# Patient Record
Sex: FEMALE | Race: ASIAN | NOT HISPANIC OR LATINO
[De-identification: names, ages, dates, MRNs, and addresses within clinical notes are randomized per-mention and may not be internally consistent; named-entity substitution may affect disease eponyms.]

---

## 2017-07-31 ENCOUNTER — RX ONLY (OUTPATIENT)
Age: 77
Setting detail: RX ONLY
End: 2017-07-31

## 2020-12-24 ENCOUNTER — APPOINTMENT (OUTPATIENT)
Dept: RADIOLOGY | Facility: MEDICAL CENTER | Age: 80
End: 2020-12-24
Attending: EMERGENCY MEDICINE
Payer: COMMERCIAL

## 2020-12-24 ENCOUNTER — HOSPITAL ENCOUNTER (EMERGENCY)
Facility: MEDICAL CENTER | Age: 80
End: 2020-12-24
Attending: EMERGENCY MEDICINE
Payer: COMMERCIAL

## 2020-12-24 VITALS
HEIGHT: 60 IN | OXYGEN SATURATION: 96 % | SYSTOLIC BLOOD PRESSURE: 136 MMHG | DIASTOLIC BLOOD PRESSURE: 77 MMHG | BODY MASS INDEX: 21.6 KG/M2 | WEIGHT: 110 LBS | HEART RATE: 108 BPM | RESPIRATION RATE: 19 BRPM | TEMPERATURE: 96.7 F

## 2020-12-24 DIAGNOSIS — R92.1 BREAST CALCIFICATIONS: ICD-10-CM

## 2020-12-24 DIAGNOSIS — F10.920 ALCOHOLIC INTOXICATION WITHOUT COMPLICATION (HCC): ICD-10-CM
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
## 2020-12-24 PROBLEM — F10.921 ACUTE ALCOHOLIC INTOXICATION WITH DELIRIUM (HCC): Status: ACTIVE | Noted: 2020-12-24

## 2020-12-24 LAB
ABO GROUP BLD: NORMAL
ALBUMIN SERPL BCP-MCNC: 4.3 G/DL (ref 3.2–4.9)
ALBUMIN/GLOB SERPL: 1.5 G/DL
ALP SERPL-CCNC: 81 U/L (ref 30–99)
ALT SERPL-CCNC: 27 U/L (ref 2–50)
ANION GAP SERPL CALC-SCNC: 12 MMOL/L (ref 7–16)
APTT PPP: 30.5 SEC (ref 24.7–36)
AST SERPL-CCNC: 73 U/L (ref 12–45)
BILIRUB SERPL-MCNC: 0.3 MG/DL (ref 0.1–1.5)
BLD GP AB SCN SERPL QL: NORMAL
BUN SERPL-MCNC: 9 MG/DL (ref 8–22)
CALCIUM SERPL-MCNC: 9.1 MG/DL (ref 8.5–10.5)
CHLORIDE SERPL-SCNC: 91 MMOL/L (ref 96–112)
CO2 SERPL-SCNC: 26 MMOL/L (ref 20–33)
CREAT SERPL-MCNC: 0.42 MG/DL (ref 0.5–1.4)
ERYTHROCYTE [DISTWIDTH] IN BLOOD BY AUTOMATED COUNT: 41.8 FL (ref 35.9–50)
ETHANOL BLD-MCNC: 362.1 MG/DL (ref 0–10)
GLOBULIN SER CALC-MCNC: 2.8 G/DL (ref 1.9–3.5)
GLUCOSE SERPL-MCNC: 76 MG/DL (ref 65–99)
HCT VFR BLD AUTO: 39.4 % (ref 37–47)
HGB BLD-MCNC: 13.9 G/DL (ref 12–16)
INR PPP: 0.9 (ref 0.87–1.13)
MCH RBC QN AUTO: 34.8 PG (ref 27–33)
MCHC RBC AUTO-ENTMCNC: 35.3 G/DL (ref 33.6–35)
MCV RBC AUTO: 98.7 FL (ref 81.4–97.8)
PLATELET # BLD AUTO: 176 K/UL (ref 164–446)
PMV BLD AUTO: 8.8 FL (ref 9–12.9)
POTASSIUM SERPL-SCNC: 3.9 MMOL/L (ref 3.6–5.5)
PROT SERPL-MCNC: 7.1 G/DL (ref 6–8.2)
PROTHROMBIN TIME: 12.5 SEC (ref 12–14.6)
RBC # BLD AUTO: 3.99 M/UL (ref 4.2–5.4)
RH BLD: NORMAL
SODIUM SERPL-SCNC: 129 MMOL/L (ref 135–145)
WBC # BLD AUTO: 2.9 K/UL (ref 4.8–10.8)

## 2020-12-24 PROCEDURE — 71045 X-RAY EXAM CHEST 1 VIEW: CPT

## 2020-12-24 PROCEDURE — 80307 DRUG TEST PRSMV CHEM ANLYZR: CPT

## 2020-12-24 PROCEDURE — 72125 CT NECK SPINE W/O DYE: CPT

## 2020-12-24 PROCEDURE — 70450 CT HEAD/BRAIN W/O DYE: CPT

## 2020-12-24 PROCEDURE — 36415 COLL VENOUS BLD VENIPUNCTURE: CPT

## 2020-12-24 PROCEDURE — 85730 THROMBOPLASTIN TIME PARTIAL: CPT

## 2020-12-24 PROCEDURE — 72170 X-RAY EXAM OF PELVIS: CPT

## 2020-12-24 PROCEDURE — 71260 CT THORAX DX C+: CPT

## 2020-12-24 PROCEDURE — 80053 COMPREHEN METABOLIC PANEL: CPT

## 2020-12-24 PROCEDURE — 305949 HCHG RED TRAUMA ACT PRE-NOTIFY NO CC

## 2020-12-24 PROCEDURE — 85027 COMPLETE CBC AUTOMATED: CPT

## 2020-12-24 PROCEDURE — 86901 BLOOD TYPING SEROLOGIC RH(D): CPT

## 2020-12-24 PROCEDURE — 72131 CT LUMBAR SPINE W/O DYE: CPT

## 2020-12-24 PROCEDURE — 700105 HCHG RX REV CODE 258: Performed by: EMERGENCY MEDICINE

## 2020-12-24 PROCEDURE — 99285 EMERGENCY DEPT VISIT HI MDM: CPT

## 2020-12-24 PROCEDURE — 86900 BLOOD TYPING SEROLOGIC ABO: CPT

## 2020-12-24 PROCEDURE — 86850 RBC ANTIBODY SCREEN: CPT

## 2020-12-24 PROCEDURE — 85610 PROTHROMBIN TIME: CPT

## 2020-12-24 PROCEDURE — 72128 CT CHEST SPINE W/O DYE: CPT

## 2020-12-24 PROCEDURE — 700117 HCHG RX CONTRAST REV CODE 255: Performed by: EMERGENCY MEDICINE

## 2020-12-24 RX ORDER — SODIUM CHLORIDE, SODIUM LACTATE, POTASSIUM CHLORIDE, CALCIUM CHLORIDE 600; 310; 30; 20 MG/100ML; MG/100ML; MG/100ML; MG/100ML
1000 INJECTION, SOLUTION INTRAVENOUS ONCE
Status: COMPLETED | OUTPATIENT
Start: 2020-12-24 | End: 2020-12-24

## 2020-12-24 RX ADMIN — SODIUM CHLORIDE, POTASSIUM CHLORIDE, SODIUM LACTATE AND CALCIUM CHLORIDE 1000 ML: 600; 310; 30; 20 INJECTION, SOLUTION INTRAVENOUS at 03:12

## 2020-12-24 RX ADMIN — IOHEXOL 80 ML: 350 INJECTION, SOLUTION INTRAVENOUS at 02:10

## 2020-12-24 SDOH — HEALTH STABILITY: MENTAL HEALTH: HOW OFTEN DO YOU HAVE A DRINK CONTAINING ALCOHOL?: 4 OR MORE TIMES A WEEK

## 2020-12-24 ASSESSMENT — LIFESTYLE VARIABLES
DO YOU DRINK ALCOHOL: YES
REASON UNABLE TO ASSESS: DISORIENTED

## 2020-12-24 NOTE — ED NOTES
ERP at bedside walking patient. Patient okay to go. Called patient's caregiver Mark, he will be here soon.

## 2020-12-24 NOTE — ED NOTES
Patient assisted to the bathroom, patient continues to need assistance. Spoke to patient's caregiver, Mark, over the phone, plan of care updated, notified ERP. ERP stated patient needs to ambulate independently before leaving.      mark: [phone]

## 2020-12-24 NOTE — ED NOTES
Pt discharged home with SO Rabia. IV discontinued and gauze placed, pt in possession of belongings. Caregiver rabia refused discharge paperwork and upset. Yelling at RN about patient's close. Patient's close were soiled, and RN explained to patient's caregiver patient was a trauma Red and clothes had to be cut in order to assess the patient in the trauma bay. Caregiver continued to take photos of the room, and the patient's clothes. Charge RN at bedside to speak to patient's caregiver. RN assisted patient in brand new clothes and jacket, socks, and provided hospital socks to patient. ERP spoke to patient's SO/caregiver, however patient's caregiver only upset about clothes and how RN has to pay for them. Patient's caregiver refused a WC when offered to patient. Patient ambulatory holding caregiver's arm to the lobby.     /77   Pulse (!) 108   Temp 35.9 °C (96.7 °F) (Temporal)   Resp 19   Ht 1.524 m (5')   Wt 49.9 kg (110 lb)   SpO2 96%   BMI 21.48 kg/m²

## 2020-12-24 NOTE — ED NOTES
Patient C-spine cleared, discussed with GAURANG reyez, removed c-collar, walked patient to the bathroom, patient a SBA due to some unsteadiness. Patient voided and assisted back to room. Provided patient telephone to call friend.

## 2020-12-24 NOTE — ED NOTES
LR bolus administered per MAR. Patient tearful, RN at bedside providing therapeutic communication and active listening. VS taken.

## 2020-12-24 NOTE — H&P
DATE OF ADMISSION:  12/24/2020     REASON FOR EVALUATION: Trauma red activation.     HISTORY OF PRESENT ILLNESS:  This is an 80-year-old woman.  She apparently was   drinking in a bar all day and fell off her bar stool.  She had altered mental   status and was brought in as a trauma red.  The patient did have a bruise on   her back but no other stigmata of trauma.  Her vital signs were stable.  Her   GCS was 11 based primarily on incomprehensible speech and inconsistent   following of commands.  The patient did not have focal neurologic findings.    The patient was evaluated extensively radiologically essentially getting a pan   scan that did not show any acute injuries.     PAST MEDICAL HISTORY:  Significant for no known allergies.  She currently is   still somewhat incoherent and uncooperative and unable to provide any   meaningful past medical history.  The patient has no records here.     REVIEW OF SYSTEMS:  The patient's review of systems is currently unobtainable.     SOCIAL HISTORY:  She does apparently drink.     PHYSICAL EXAMINATION:    GENERAL:  Shows a slender woman of stated age who does not appear to be in   marked acute distress.  Intermittently, she is sitting up.  She is not   conversational.  She is no longer wearing a cervical collar.  She has been   hemodynamically stable.  HEAD:  She has no stigmata of head trauma.  SKIN:  She does have a bruise on her back.  NECK:  Atraumatic.  CHEST:  Clear.  HEART:  Normal.  ABDOMEN:  Soft and benign.  EXTREMITIES:  Free of deformity.  No joint effusion or edema.     LABORATORY DATA:  The patient's laboratories featured blood alcohol of 362.1.    Her sodium was 129 with a normal blood glucose of 76.  Her creatinine was   0.42, AST was 73.  PT was normal at 12.5 with a PTT of 30.5.  She has a white   count of 2.9 with hemoglobin of 13.9.  Her MCV is elevated at 99.  Platelet   count was 176.     The patient's radiologic evaluation included an AP pelvis, which  showed no   fracture.  Chest x-ray which was normal.  A CT scan of the head which shows no   evidence of acute infarct, hemorrhage or mass.  She does have significant   global panatrophy of the cerebrum.  The patient's chest, abdomen and pelvis   showed no acute injury with no distended bladder.  She had granulomas or   calcifications in both breasts and gallstones.  CT of her L-spine showed no   traumatic injury.  CT scan of the C-spine was negative for traumatic injury   and C-spine CT was negative for traumatic injury.     The patient's clinical course has been one of progressive improvement.  ER   physician plans continued observation in the emergency room to the point where   the patient could be eligible for reevaluation, tertiary survey and likely   discharge.     IMPRESSION:  Clinical impression at this time is intoxication with delirium,   which is improving.     PLAN:  The current plan is acceptable to the trauma service.     Time of evaluation, clinical care setting 60 minutes of critical care time on   12/24/2020.        ______________________________  MD TORREY GIRALDO/HERVE    DD:  12/24/2020 03:52  DT:  12/24/2020 04:57    Job#:  [id number]

## 2020-12-24 NOTE — ED NOTES
Patient keeps removing leads a pulse ox, despite education. Patient moving all four extremities, trying to swing at staff, RN re-oriented patient.

## 2020-12-24 NOTE — ED TRIAGE NOTES
Bibi N[place]  80 y.o. female  Chief Complaint   Patient presents with   • Trauma Red     Fell out of chair, hematoma to back of head, GCS 11. Unknown blood thinners     Pt BIB EMS as a Trauma Red for above CC.    Report given to Akila PHOENIX.     Blood Pressure : (!) 178/100, Pulse: 81, Respiration: 16, Temperature: 36.5 °C (97.7 °F), Height: 152.4 cm (5'), Weight: 49.9 kg (110 lb), BMI (Calculated): 21.48, BSA (Calculated): 1.5, Pulse Oximetry: 97 %, O2 (LPM): 0

## 2020-12-24 NOTE — ED PROVIDER NOTES
ED Provider Note    Patient's care transferred from [facility].  Patient came in as a trauma red.  She fell off a stool and was altered this is thought to be secondary to alcohol intoxication.  Her alcohol level was quite high.  She was pan scanned, CTs were unrevealing.  Anticipate discharge to home once the patient can ambulate and has returned to baseline.    4:45 AM patient seen at the bedside.  She is awake and alert.  She ambulated with me in the hallways.  I feel she can safely be discharged home.  Her roommate/caregiver/boyfriend, is on his way.  Will discuss ongoing care with him.    5:04 AM patient's family member is at the bedside.  He is appropriate.  He assures me, he stays with her at all times and will ensure that patient does not fall.  In his care, I feel the patient can safely be discharged home to continue to sober up.  She is awake and alert.  She is able to ambulate.  Patient be discharged to home.  She is in stable condition.

## 2020-12-24 NOTE — ED NOTES
"Patient sleeping on gurney, report given by Cj PHOENIX, assumed care of patient. Patient placed in gown, and on montior. Patient is disoriented and keeps asking about \"Mark,\" pt unable to remember a phone number. Patient is tearful.   "

## 2020-12-24 NOTE — ED PROVIDER NOTES
ED Provider Note    CHIEF COMPLAINT  Chief Complaint   Patient presents with   • Trauma Red     Fell out of chair, hematoma to back of head, GCS 11. Unknown blood thinners       HPI  Bibi Maher is a 80 y.o. female who presents after a fall from a seated position while in a bar.  She had been there for several hours.  She struck the back of her head on the floor.  She has very slurred speech and delayed responses.  Unable to provide much history due to intoxicated state.  Oriented to self only.    REVIEW OF SYSTEMS  See HPI for further details.  Limited secondary to the patient's intoxicated state    PAST MEDICAL HISTORY       SOCIAL HISTORY  Social History     Tobacco Use   • Smoking status: Never Smoker   • Smokeless tobacco: Never Used   • Tobacco comment: daily   Substance and Sexual Activity   • Alcohol use: Yes     Frequency: 4 or more times a week   • Drug use: Never   • Sexual activity: Not on file       SURGICAL HISTORY  patient denies any surgical history    CURRENT MEDICATIONS  Home Medications     Reviewed by Cj Estes R.N. (Registered Nurse) on 12/24/20 at 0224  Med List Status: Partial   Medication Last Dose Status        Patient Madi Taking any Medications                       ALLERGIES  Not on File    PHYSICAL EXAM  VITAL SIGNS: BP (!) 178/100   Pulse 81   Temp 36.5 °C (97.7 °F) (Temporal)   Resp 16   Ht 1.524 m (5')   Wt 49.9 kg (110 lb)   SpO2 97%   BMI 21.48 kg/m²   Pulse ox interpretation: I interpret this pulse ox as normal.  Constitutional: Somnolent in no apparent distress.  HENT: No signs of trauma, Bilateral external ears normal, Nose normal.   Eyes: Pupils are equal and reactive, Conjunctiva normal, Non-icteric.   Neck: Normal range of motion, No tenderness, Supple, No stridor.   Cardiovascular: Regular rate and rhythm.   Thorax & Lungs: Normal breath sounds, No respiratory distress, No wheezing, No chest tenderness.   Abdomen: Bowel sounds normal, Soft, No  tenderness, No masses, No pulsatile masses. No peritoneal signs.  Skin: Warm, Dry, No erythema, No rash.  Left elbow abrasion and small 2 cm abrasion to the mid back along the midline  Back: No bony tenderness or obvious step-off, No CVA tenderness.   Extremities: Intact distal pulses, No edema, No tenderness, No cyanosis  Musculoskeletal: Good range of motion in all major joints. No tenderness to palpation or major deformities noted.   Neurologic: Somnolent, slurred speech, is a very strong odor of alcohol, Normal motor function, Normal sensory function, No focal deficits noted.       DIAGNOSTIC STUDIES / PROCEDURES    EKG - Physician interpretation  No results found for this or any previous visit.       LABS  Labs Reviewed   DIAGNOSTIC ALCOHOL - Abnormal; Notable for the following components:       Result Value    Diagnostic Alcohol 362.1 (*)     All other components within normal limits   CBC WITHOUT DIFFERENTIAL - Abnormal; Notable for the following components:    WBC 2.9 (*)     RBC 3.99 (*)     MCV 98.7 (*)     MCH 34.8 (*)     MCHC 35.3 (*)     MPV 8.8 (*)     All other components within normal limits   COMP METABOLIC PANEL - Abnormal; Notable for the following components:    Sodium 129 (*)     Chloride 91 (*)     Creatinine 0.42 (*)     AST(SGOT) 73 (*)     All other components within normal limits   PROTHROMBIN TIME   APTT   COD (ADULT)   COMPONENT CELLULAR   ESTIMATED GFR   ABO RH CONFIRM         RADIOLOGY  CT-CSPINE WITHOUT PLUS RECONS   Final Result      No acute fracture or traumatic listhesis in the cervical spine.      CT-HEAD W/O   Final Result      1. No CT evidence of acute infarct, hemorrhage or mass.   2. Moderate global parenchymal atrophy. Chronic small vessel ischemic changes.      DX-PELVIS-1 OR 2 VIEWS   Final Result      No acute osseous abnormality.      DX-CHEST-LIMITED (1 VIEW)   Final Result      No acute cardiopulmonary abnormality.      CT-CHEST,ABDOMEN,PELVIS WITH   Final Result       1.  No acute traumatic injury in the chest, abdomen or pelvis.   2.  Overdistended bladder. Recommend catheterization.   3.  Extensive granulomas/calcifications in both breasts.   4.  Cholelithiasis.      CT-TSPINE W/O PLUS RECONS   Final Result      No acute fracture or listhesis in the thoracic spine.      CT-LSPINE W/O PLUS RECONS   Final Result      No acute fracture or traumatic listhesis in the lumbar spine.      US-ABORTED US PROCEDURE    (Results Pending)         COURSE & MEDICAL DECISION MAKING    Medications   iohexol (OMNIPAQUE) 350 mg/mL (80 mL Intravenous Given 12/24/20 0210)   lactated ringers infusion (BOLUS) (0 mL Intravenous Stopped 12/24/20 0973)       Pertinent Labs & Imaging studies reviewed. (See chart for details)  80 y.o. female presenting with altered mental status in the setting of a fall out of a chair while at a bar.  She had been drinking alcohol heavily throughout the day.  No obvious signs of trauma to the head though the patient has significantly slurred speech and is very somnolent upon arrival.  Has an abrasion to the mid back and left elbow.  Normal range of motion to her extremities.  Due to her heavily intoxicated state, broad CT imaging was performed to ensure there is no significant internal trauma or bony injury.  CT of the head, neck, chest, abdomen, pelvis, T-spine, L-spine were all unremarkable for acute injury.  Patient will be monitored here for improvement in mental status as her alcohol level was significantly elevated at 362.  This is likely the root cause of the patient's altered mental status rather than trauma.  Due to the patient's very depressed mental status upon arrival in the setting of trauma however, the patient was activated as a trauma red due to concern for possible traumatic cause of the patient's altered mental status.  Fortunately, there does not appear to be any need for surgical intervention at this time.    The patient was signed out to the [facility]  physician.  To monitor the patient's mental status and metabolism of alcohol.  Will likely discharge home once the patient is sober or once the patient is more alert and awake and can be safely discharged in the care of a family member.    CT did show incidental breast calcifications for which the patient was recommended to follow-up with a primary care physician.    The patient was instructed to follow-up with primary care physician for further management.  To return immediately for any worsening symptoms or development of any other concerning signs or symptoms. The patient verbalizes understanding in their own words.    /64   Pulse 90   Temp 36.5 °C (97.7 °F) (Temporal)   Resp 18   Ht 1.524 m (5')   Wt 49.9 kg (110 lb)   SpO2 99%   BMI 21.48 kg/m²     The patient was referred to primary care where they will receive further BP management.      No follow-up provider specified.    FINAL IMPRESSION  1. Alcoholic intoxication without complication (HCC)    2. Fall, initial encounter    3. Breast calcifications            Electronically signed by: Harry Hassan M.D., 12/24/2020 2:12 AM

## 2021-01-14 DIAGNOSIS — Z23 NEED FOR VACCINATION: ICD-10-CM

## 2023-04-26 ENCOUNTER — HOSPITAL ENCOUNTER (OUTPATIENT)
Dept: RADIOLOGY | Facility: MEDICAL CENTER | Age: 83
End: 2023-04-26
Payer: COMMERCIAL

## 2023-04-26 ENCOUNTER — HOSPITAL ENCOUNTER (INPATIENT)
Facility: MEDICAL CENTER | Age: 83
LOS: 7 days | DRG: 082 | End: 2023-05-03
Attending: EMERGENCY MEDICINE | Admitting: INTERNAL MEDICINE
Payer: COMMERCIAL

## 2023-04-26 DIAGNOSIS — S70.01XA CONTUSION OF RIGHT HIP, INITIAL ENCOUNTER: ICD-10-CM

## 2023-04-26 DIAGNOSIS — S06.5XAA SUBDURAL HEMATOMA (HCC): ICD-10-CM

## 2023-04-26 DIAGNOSIS — S06.5XAA SDH (SUBDURAL HEMATOMA) (HCC): ICD-10-CM

## 2023-04-26 DIAGNOSIS — I62.00 SUBDURAL HEMORRHAGE (HCC): ICD-10-CM

## 2023-04-26 PROBLEM — F10.10 ALCOHOL ABUSE: Status: ACTIVE | Noted: 2023-04-26

## 2023-04-26 LAB
ABO GROUP BLD: NORMAL
ALBUMIN SERPL BCP-MCNC: 2.8 G/DL (ref 3.2–4.9)
ALBUMIN/GLOB SERPL: 1.2 G/DL
ALP SERPL-CCNC: 60 U/L (ref 30–99)
ALT SERPL-CCNC: 12 U/L (ref 2–50)
ANION GAP SERPL CALC-SCNC: 12 MMOL/L (ref 7–16)
APTT PPP: 29.3 SEC (ref 24.7–36)
AST SERPL-CCNC: 31 U/L (ref 12–45)
BASOPHILS # BLD AUTO: 0.2 % (ref 0–1.8)
BASOPHILS # BLD: 0.01 K/UL (ref 0–0.12)
BILIRUB SERPL-MCNC: 0.9 MG/DL (ref 0.1–1.5)
BLD GP AB SCN SERPL QL: NORMAL
BUN SERPL-MCNC: 11 MG/DL (ref 8–22)
CALCIUM ALBUM COR SERPL-MCNC: 8 MG/DL (ref 8.5–10.5)
CALCIUM SERPL-MCNC: 7 MG/DL (ref 8.5–10.5)
CFT BLD TEG: 5.1 MIN (ref 4.6–9.1)
CFT P HPASE BLD TEG: 5.3 MIN (ref 4.3–8.3)
CHLORIDE SERPL-SCNC: 104 MMOL/L (ref 96–112)
CHOLEST SERPL-MCNC: 119 MG/DL (ref 100–199)
CLOT ANGLE BLD TEG: 68.8 DEGREES (ref 63–78)
CO2 SERPL-SCNC: 23 MMOL/L (ref 20–33)
CREAT SERPL-MCNC: 0.48 MG/DL (ref 0.5–1.4)
CT.EXTRINSIC BLD ROTEM: 1.9 MIN (ref 0.8–2.1)
EOSINOPHIL # BLD AUTO: 0 K/UL (ref 0–0.51)
EOSINOPHIL NFR BLD: 0 % (ref 0–6.9)
ERYTHROCYTE [DISTWIDTH] IN BLOOD BY AUTOMATED COUNT: 45.2 FL (ref 35.9–50)
ETHANOL BLD-MCNC: <10.1 MG/DL
GFR SERPLBLD CREATININE-BSD FMLA CKD-EPI: 93 ML/MIN/1.73 M 2
GLOBULIN SER CALC-MCNC: 2.3 G/DL (ref 1.9–3.5)
GLUCOSE SERPL-MCNC: 85 MG/DL (ref 65–99)
HCT VFR BLD AUTO: 32 % (ref 37–47)
HDLC SERPL-MCNC: 66 MG/DL
HGB BLD-MCNC: 10.7 G/DL (ref 12–16)
IMM GRANULOCYTES # BLD AUTO: 0.02 K/UL (ref 0–0.11)
IMM GRANULOCYTES NFR BLD AUTO: 0.5 % (ref 0–0.9)
INR PPP: 1.05 (ref 0.87–1.13)
LDLC SERPL CALC-MCNC: 44 MG/DL
LYMPHOCYTES # BLD AUTO: 0.53 K/UL (ref 1–4.8)
LYMPHOCYTES NFR BLD: 12.5 % (ref 22–41)
MCF BLD TEG: 49.4 MM (ref 52–69)
MCF.PLATELET INHIB BLD ROTEM: 14.7 MM (ref 15–32)
MCH RBC QN AUTO: 33.5 PG (ref 27–33)
MCHC RBC AUTO-ENTMCNC: 33.4 G/DL (ref 33.6–35)
MCV RBC AUTO: 100.3 FL (ref 81.4–97.8)
MONOCYTES # BLD AUTO: 0.38 K/UL (ref 0–0.85)
MONOCYTES NFR BLD AUTO: 8.9 % (ref 0–13.4)
NEUTROPHILS # BLD AUTO: 3.31 K/UL (ref 2–7.15)
NEUTROPHILS NFR BLD: 77.9 % (ref 44–72)
NRBC # BLD AUTO: 0 K/UL
NRBC BLD-RTO: 0 /100 WBC
PA AA BLD-ACNC: 45.8 % (ref 0–11)
PA ADP BLD-ACNC: 55.6 % (ref 0–17)
PLATELET # BLD AUTO: 108 K/UL (ref 164–446)
PMV BLD AUTO: 9.3 FL (ref 9–12.9)
POTASSIUM SERPL-SCNC: 3.3 MMOL/L (ref 3.6–5.5)
PROT SERPL-MCNC: 5.1 G/DL (ref 6–8.2)
PROTHROMBIN TIME: 13.6 SEC (ref 12–14.6)
RBC # BLD AUTO: 3.19 M/UL (ref 4.2–5.4)
RH BLD: NORMAL
SODIUM SERPL-SCNC: 139 MMOL/L (ref 135–145)
TEG ALGORITHM TGALG: ABNORMAL
TRIGL SERPL-MCNC: 46 MG/DL (ref 0–149)
WBC # BLD AUTO: 4.3 K/UL (ref 4.8–10.8)

## 2023-04-26 PROCEDURE — 80053 COMPREHEN METABOLIC PANEL: CPT

## 2023-04-26 PROCEDURE — 80061 LIPID PANEL: CPT

## 2023-04-26 PROCEDURE — 85384 FIBRINOGEN ACTIVITY: CPT

## 2023-04-26 PROCEDURE — 86850 RBC ANTIBODY SCREEN: CPT

## 2023-04-26 PROCEDURE — 85610 PROTHROMBIN TIME: CPT

## 2023-04-26 PROCEDURE — 82077 ASSAY SPEC XCP UR&BREATH IA: CPT

## 2023-04-26 PROCEDURE — 36415 COLL VENOUS BLD VENIPUNCTURE: CPT

## 2023-04-26 PROCEDURE — 85730 THROMBOPLASTIN TIME PARTIAL: CPT

## 2023-04-26 PROCEDURE — 99285 EMERGENCY DEPT VISIT HI MDM: CPT

## 2023-04-26 PROCEDURE — 85347 COAGULATION TIME ACTIVATED: CPT

## 2023-04-26 PROCEDURE — 85576 BLOOD PLATELET AGGREGATION: CPT

## 2023-04-26 PROCEDURE — 86901 BLOOD TYPING SEROLOGIC RH(D): CPT

## 2023-04-26 PROCEDURE — 305948 HCHG GREEN TRAUMA ACT PRE-NOTIFY NO CC

## 2023-04-26 PROCEDURE — 700105 HCHG RX REV CODE 258: Performed by: INTERNAL MEDICINE

## 2023-04-26 PROCEDURE — 86900 BLOOD TYPING SEROLOGIC ABO: CPT

## 2023-04-26 PROCEDURE — 99223 1ST HOSP IP/OBS HIGH 75: CPT | Mod: AI | Performed by: INTERNAL MEDICINE

## 2023-04-26 PROCEDURE — HZ2ZZZZ DETOXIFICATION SERVICES FOR SUBSTANCE ABUSE TREATMENT: ICD-10-PCS | Performed by: INTERNAL MEDICINE

## 2023-04-26 PROCEDURE — 770020 HCHG ROOM/CARE - TELE (206)

## 2023-04-26 PROCEDURE — 85025 COMPLETE CBC W/AUTO DIFF WBC: CPT

## 2023-04-26 PROCEDURE — 99222 1ST HOSP IP/OBS MODERATE 55: CPT | Performed by: SURGERY

## 2023-04-26 RX ORDER — BRIMONIDINE TARTRATE 2 MG/ML
1 SOLUTION/ DROPS OPHTHALMIC DAILY
Status: DISCONTINUED | OUTPATIENT
Start: 2023-04-27 | End: 2023-05-03 | Stop reason: HOSPADM

## 2023-04-26 RX ORDER — LABETALOL 200 MG/1
200 TABLET, FILM COATED ORAL EVERY 6 HOURS PRN
Status: DISCONTINUED | OUTPATIENT
Start: 2023-04-26 | End: 2023-05-03 | Stop reason: HOSPADM

## 2023-04-26 RX ORDER — OXYCODONE HYDROCHLORIDE 10 MG/1
10 TABLET ORAL
Status: DISCONTINUED | OUTPATIENT
Start: 2023-04-26 | End: 2023-05-03 | Stop reason: HOSPADM

## 2023-04-26 RX ORDER — LORAZEPAM 2 MG/ML
1 INJECTION INTRAMUSCULAR
Status: DISCONTINUED | OUTPATIENT
Start: 2023-04-26 | End: 2023-04-29

## 2023-04-26 RX ORDER — ONDANSETRON 2 MG/ML
4 INJECTION INTRAMUSCULAR; INTRAVENOUS EVERY 4 HOURS PRN
Status: DISCONTINUED | OUTPATIENT
Start: 2023-04-26 | End: 2023-04-26

## 2023-04-26 RX ORDER — LORAZEPAM 0.5 MG/1
0.5 TABLET ORAL EVERY 4 HOURS PRN
Status: DISCONTINUED | OUTPATIENT
Start: 2023-04-26 | End: 2023-04-29

## 2023-04-26 RX ORDER — LORAZEPAM 2 MG/ML
0.5 INJECTION INTRAMUSCULAR EVERY 4 HOURS PRN
Status: DISCONTINUED | OUTPATIENT
Start: 2023-04-26 | End: 2023-04-29

## 2023-04-26 RX ORDER — ONDANSETRON 4 MG/1
4 TABLET, ORALLY DISINTEGRATING ORAL EVERY 4 HOURS PRN
Status: DISCONTINUED | OUTPATIENT
Start: 2023-04-26 | End: 2023-05-03 | Stop reason: HOSPADM

## 2023-04-26 RX ORDER — ACETAMINOPHEN 325 MG/1
650 TABLET ORAL EVERY 6 HOURS PRN
Status: DISCONTINUED | OUTPATIENT
Start: 2023-04-26 | End: 2023-05-03 | Stop reason: HOSPADM

## 2023-04-26 RX ORDER — LORAZEPAM 2 MG/ML
2 INJECTION INTRAMUSCULAR
Status: DISCONTINUED | OUTPATIENT
Start: 2023-04-26 | End: 2023-04-29

## 2023-04-26 RX ORDER — BISACODYL 10 MG
10 SUPPOSITORY, RECTAL RECTAL
Status: DISCONTINUED | OUTPATIENT
Start: 2023-04-26 | End: 2023-05-03 | Stop reason: HOSPADM

## 2023-04-26 RX ORDER — ONDANSETRON 2 MG/ML
4 INJECTION INTRAMUSCULAR; INTRAVENOUS EVERY 4 HOURS PRN
Status: DISCONTINUED | OUTPATIENT
Start: 2023-04-26 | End: 2023-05-03 | Stop reason: HOSPADM

## 2023-04-26 RX ORDER — ONDANSETRON 4 MG/1
4 TABLET, ORALLY DISINTEGRATING ORAL EVERY 4 HOURS PRN
Status: DISCONTINUED | OUTPATIENT
Start: 2023-04-26 | End: 2023-04-26

## 2023-04-26 RX ORDER — GAUZE BANDAGE 2" X 2"
100 BANDAGE TOPICAL DAILY
Status: COMPLETED | OUTPATIENT
Start: 2023-04-27 | End: 2023-04-30

## 2023-04-26 RX ORDER — FOLIC ACID 1 MG/1
1 TABLET ORAL DAILY
Status: COMPLETED | OUTPATIENT
Start: 2023-04-27 | End: 2023-04-30

## 2023-04-26 RX ORDER — LABETALOL HYDROCHLORIDE 5 MG/ML
10 INJECTION, SOLUTION INTRAVENOUS
Status: DISCONTINUED | OUTPATIENT
Start: 2023-04-26 | End: 2023-05-03 | Stop reason: HOSPADM

## 2023-04-26 RX ORDER — OXYCODONE HYDROCHLORIDE 5 MG/1
5 TABLET ORAL
Status: DISCONTINUED | OUTPATIENT
Start: 2023-04-26 | End: 2023-05-03 | Stop reason: HOSPADM

## 2023-04-26 RX ORDER — LORAZEPAM 2 MG/1
2 TABLET ORAL
Status: DISCONTINUED | OUTPATIENT
Start: 2023-04-26 | End: 2023-04-29

## 2023-04-26 RX ORDER — AMOXICILLIN 250 MG
2 CAPSULE ORAL 2 TIMES DAILY
Status: DISCONTINUED | OUTPATIENT
Start: 2023-04-26 | End: 2023-05-03 | Stop reason: HOSPADM

## 2023-04-26 RX ORDER — LORAZEPAM 2 MG/1
4 TABLET ORAL
Status: DISCONTINUED | OUTPATIENT
Start: 2023-04-26 | End: 2023-04-29

## 2023-04-26 RX ORDER — ACETAMINOPHEN 650 MG/1
650 SUPPOSITORY RECTAL EVERY 4 HOURS PRN
Status: DISCONTINUED | OUTPATIENT
Start: 2023-04-26 | End: 2023-05-03 | Stop reason: HOSPADM

## 2023-04-26 RX ORDER — LORAZEPAM 2 MG/ML
1.5 INJECTION INTRAMUSCULAR
Status: DISCONTINUED | OUTPATIENT
Start: 2023-04-26 | End: 2023-04-29

## 2023-04-26 RX ORDER — SODIUM CHLORIDE 9 MG/ML
INJECTION, SOLUTION INTRAVENOUS CONTINUOUS
Status: DISCONTINUED | OUTPATIENT
Start: 2023-04-26 | End: 2023-04-27

## 2023-04-26 RX ORDER — ACETAMINOPHEN 325 MG/1
650 TABLET ORAL EVERY 4 HOURS PRN
Status: DISCONTINUED | OUTPATIENT
Start: 2023-04-26 | End: 2023-05-03 | Stop reason: HOSPADM

## 2023-04-26 RX ORDER — LORAZEPAM 1 MG/1
1 TABLET ORAL EVERY 4 HOURS PRN
Status: DISCONTINUED | OUTPATIENT
Start: 2023-04-26 | End: 2023-04-29

## 2023-04-26 RX ORDER — POLYETHYLENE GLYCOL 3350 17 G/17G
1 POWDER, FOR SOLUTION ORAL
Status: DISCONTINUED | OUTPATIENT
Start: 2023-04-26 | End: 2023-05-03 | Stop reason: HOSPADM

## 2023-04-26 RX ORDER — BRIMONIDINE TARTRATE 1.5 MG/ML
1 SOLUTION/ DROPS OPHTHALMIC DAILY
COMMUNITY
Start: 2023-02-01

## 2023-04-26 RX ORDER — MORPHINE SULFATE 4 MG/ML
4 INJECTION INTRAVENOUS
Status: DISCONTINUED | OUTPATIENT
Start: 2023-04-26 | End: 2023-05-03 | Stop reason: HOSPADM

## 2023-04-26 RX ADMIN — SODIUM CHLORIDE: 9 INJECTION, SOLUTION INTRAVENOUS at 23:26

## 2023-04-26 ASSESSMENT — LIFESTYLE VARIABLES
HOW MANY TIMES IN THE PAST YEAR HAVE YOU HAD 5 OR MORE DRINKS IN A DAY: 0
TOTAL SCORE: 2
TREMOR: NO TREMOR
HEADACHE, FULLNESS IN HEAD: NOT PRESENT
PAROXYSMAL SWEATS: NO SWEAT VISIBLE
AUDITORY DISTURBANCES: NOT PRESENT
AVERAGE NUMBER OF DAYS PER WEEK YOU HAVE A DRINK CONTAINING ALCOHOL: 21
EVER FELT BAD OR GUILTY ABOUT YOUR DRINKING: NO
TOTAL SCORE: 2
NAUSEA AND VOMITING: NO NAUSEA AND NO VOMITING
TREMOR: NO TREMOR
CONSUMPTION TOTAL: POSITIVE
ANXIETY: NO ANXIETY (AT EASE)
TOTAL SCORE: 0
DOES PATIENT WANT TO STOP DRINKING: NO
VISUAL DISTURBANCES: NOT PRESENT
HEADACHE, FULLNESS IN HEAD: NOT PRESENT
HAVE YOU EVER FELT YOU SHOULD CUT DOWN ON YOUR DRINKING: YES
PAROXYSMAL SWEATS: NO SWEAT VISIBLE
ANXIETY: NO ANXIETY (AT EASE)
ALCOHOL_USE: YES
AUDITORY DISTURBANCES: NOT PRESENT
TOTAL SCORE: 0
DO YOU DRINK ALCOHOL: YES
VISUAL DISTURBANCES: NOT PRESENT
AGITATION: NORMAL ACTIVITY
DOES PATIENT WANT TO STOP DRINKING: NO
HAVE PEOPLE ANNOYED YOU BY CRITICIZING YOUR DRINKING: YES
EVER HAD A DRINK FIRST THING IN THE MORNING TO STEADY YOUR NERVES TO GET RID OF A HANGOVER: NO
NAUSEA AND VOMITING: NO NAUSEA AND NO VOMITING
AGITATION: NORMAL ACTIVITY
TOTAL SCORE: 2
ORIENTATION AND CLOUDING OF SENSORIUM: ORIENTED AND CAN DO SERIAL ADDITIONS
ORIENTATION AND CLOUDING OF SENSORIUM: ORIENTED AND CAN DO SERIAL ADDITIONS
ON A TYPICAL DAY WHEN YOU DRINK ALCOHOL HOW MANY DRINKS DO YOU HAVE: 3

## 2023-04-26 ASSESSMENT — COPD QUESTIONNAIRES
DURING THE PAST 4 WEEKS HOW MUCH DID YOU FEEL SHORT OF BREATH: NONE/LITTLE OF THE TIME
COPD SCREENING SCORE: 5
DO YOU EVER COUGH UP ANY MUCUS OR PHLEGM?: YES, A FEW DAYS A WEEK OR MONTH
HAVE YOU SMOKED AT LEAST 100 CIGARETTES IN YOUR ENTIRE LIFE: YES

## 2023-04-26 ASSESSMENT — COGNITIVE AND FUNCTIONAL STATUS - GENERAL
DRESSING REGULAR LOWER BODY CLOTHING: A LOT
CLIMB 3 TO 5 STEPS WITH RAILING: A LOT
TURNING FROM BACK TO SIDE WHILE IN FLAT BAD: A LITTLE
DRESSING REGULAR UPPER BODY CLOTHING: A LITTLE
HELP NEEDED FOR BATHING: A LITTLE
TOILETING: A LOT
STANDING UP FROM CHAIR USING ARMS: A LITTLE
SUGGESTED CMS G CODE MODIFIER MOBILITY: CK
DAILY ACTIVITIY SCORE: 18
MOBILITY SCORE: 13
SUGGESTED CMS G CODE MODIFIER DAILY ACTIVITY: CK
STANDING UP FROM CHAIR USING ARMS: A LOT
CLIMB 3 TO 5 STEPS WITH RAILING: A LOT
HELP NEEDED FOR BATHING: A LITTLE
DRESSING REGULAR UPPER BODY CLOTHING: A LITTLE
SUGGESTED CMS G CODE MODIFIER MOBILITY: CL
MOVING TO AND FROM BED TO CHAIR: A LITTLE
DAILY ACTIVITIY SCORE: 19
MOVING TO AND FROM BED TO CHAIR: A LOT
TURNING FROM BACK TO SIDE WHILE IN FLAT BAD: A LITTLE
WALKING IN HOSPITAL ROOM: A LOT
SUGGESTED CMS G CODE MODIFIER DAILY ACTIVITY: CK
MOBILITY SCORE: 15
WALKING IN HOSPITAL ROOM: A LOT
DRESSING REGULAR LOWER BODY CLOTHING: A LITTLE
MOVING FROM LYING ON BACK TO SITTING ON SIDE OF FLAT BED: A LOT
MOVING FROM LYING ON BACK TO SITTING ON SIDE OF FLAT BED: A LOT
TOILETING: A LOT

## 2023-04-26 ASSESSMENT — FIBROSIS 4 INDEX
FIB4 SCORE: 6.96
FIB4 SCORE: 6.96

## 2023-04-26 ASSESSMENT — PAIN DESCRIPTION - PAIN TYPE: TYPE: ACUTE PAIN

## 2023-04-26 ASSESSMENT — PATIENT HEALTH QUESTIONNAIRE - PHQ9
1. LITTLE INTEREST OR PLEASURE IN DOING THINGS: NOT AT ALL
SUM OF ALL RESPONSES TO PHQ9 QUESTIONS 1 AND 2: 0
2. FEELING DOWN, DEPRESSED, IRRITABLE, OR HOPELESS: NOT AT ALL

## 2023-04-27 ENCOUNTER — APPOINTMENT (OUTPATIENT)
Dept: RADIOLOGY | Facility: MEDICAL CENTER | Age: 83
DRG: 082 | End: 2023-04-27
Attending: STUDENT IN AN ORGANIZED HEALTH CARE EDUCATION/TRAINING PROGRAM
Payer: COMMERCIAL

## 2023-04-27 PROBLEM — Z71.89 ADVANCE CARE PLANNING: Status: ACTIVE | Noted: 2023-04-27

## 2023-04-27 PROBLEM — E87.6 HYPOKALEMIA: Status: ACTIVE | Noted: 2023-04-27

## 2023-04-27 PROBLEM — D69.6 THROMBOCYTOPENIA (HCC): Status: ACTIVE | Noted: 2023-04-27

## 2023-04-27 PROBLEM — M25.551 PAIN OF RIGHT HIP: Status: ACTIVE | Noted: 2023-04-27

## 2023-04-27 LAB
APPEARANCE UR: CLEAR
BACTERIA #/AREA URNS HPF: ABNORMAL /HPF
BASOPHILS # BLD AUTO: 0.3 % (ref 0–1.8)
BASOPHILS # BLD: 0.01 K/UL (ref 0–0.12)
BILIRUB UR QL STRIP.AUTO: NEGATIVE
COLOR UR: YELLOW
EOSINOPHIL # BLD AUTO: 0.01 K/UL (ref 0–0.51)
EOSINOPHIL NFR BLD: 0.3 % (ref 0–6.9)
EPI CELLS #/AREA URNS HPF: ABNORMAL /HPF
ERYTHROCYTE [DISTWIDTH] IN BLOOD BY AUTOMATED COUNT: 46.7 FL (ref 35.9–50)
GLUCOSE UR STRIP.AUTO-MCNC: 500 MG/DL
HCT VFR BLD AUTO: 35.3 % (ref 37–47)
HGB BLD-MCNC: 11.6 G/DL (ref 12–16)
HYALINE CASTS #/AREA URNS LPF: ABNORMAL /LPF
IMM GRANULOCYTES # BLD AUTO: 0.06 K/UL (ref 0–0.11)
IMM GRANULOCYTES NFR BLD AUTO: 1.7 % (ref 0–0.9)
KETONES UR STRIP.AUTO-MCNC: ABNORMAL MG/DL
LEUKOCYTE ESTERASE UR QL STRIP.AUTO: ABNORMAL
LYMPHOCYTES # BLD AUTO: 0.81 K/UL (ref 1–4.8)
LYMPHOCYTES NFR BLD: 22.3 % (ref 22–41)
MAGNESIUM SERPL-MCNC: 1.7 MG/DL (ref 1.5–2.5)
MCH RBC QN AUTO: 33.4 PG (ref 27–33)
MCHC RBC AUTO-ENTMCNC: 32.9 G/DL (ref 33.6–35)
MCV RBC AUTO: 101.7 FL (ref 81.4–97.8)
MICRO URNS: ABNORMAL
MONOCYTES # BLD AUTO: 0.41 K/UL (ref 0–0.85)
MONOCYTES NFR BLD AUTO: 11.3 % (ref 0–13.4)
NEUTROPHILS # BLD AUTO: 2.33 K/UL (ref 2–7.15)
NEUTROPHILS NFR BLD: 64.1 % (ref 44–72)
NITRITE UR QL STRIP.AUTO: NEGATIVE
NRBC # BLD AUTO: 0 K/UL
NRBC BLD-RTO: 0 /100 WBC
PH UR STRIP.AUTO: 7 [PH] (ref 5–8)
PHOSPHATE SERPL-MCNC: 3.2 MG/DL (ref 2.5–4.5)
PLATELET # BLD AUTO: 117 K/UL (ref 164–446)
PMV BLD AUTO: 9.2 FL (ref 9–12.9)
PROT UR QL STRIP: NEGATIVE MG/DL
RBC # BLD AUTO: 3.47 M/UL (ref 4.2–5.4)
RBC # URNS HPF: ABNORMAL /HPF
RBC UR QL AUTO: ABNORMAL
SP GR UR STRIP.AUTO: 1.01
UROBILINOGEN UR STRIP.AUTO-MCNC: 0.2 MG/DL
WBC # BLD AUTO: 3.6 K/UL (ref 4.8–10.8)
WBC #/AREA URNS HPF: ABNORMAL /HPF

## 2023-04-27 PROCEDURE — 36415 COLL VENOUS BLD VENIPUNCTURE: CPT

## 2023-04-27 PROCEDURE — 84100 ASSAY OF PHOSPHORUS: CPT

## 2023-04-27 PROCEDURE — 97162 PT EVAL MOD COMPLEX 30 MIN: CPT

## 2023-04-27 PROCEDURE — 97166 OT EVAL MOD COMPLEX 45 MIN: CPT

## 2023-04-27 PROCEDURE — 700102 HCHG RX REV CODE 250 W/ 637 OVERRIDE(OP): Performed by: NURSE PRACTITIONER

## 2023-04-27 PROCEDURE — A9270 NON-COVERED ITEM OR SERVICE: HCPCS | Performed by: NURSE PRACTITIONER

## 2023-04-27 PROCEDURE — 85025 COMPLETE CBC W/AUTO DIFF WBC: CPT

## 2023-04-27 PROCEDURE — 73502 X-RAY EXAM HIP UNI 2-3 VIEWS: CPT | Mod: RT

## 2023-04-27 PROCEDURE — 700101 HCHG RX REV CODE 250: Performed by: PHYSICAL MEDICINE & REHABILITATION

## 2023-04-27 PROCEDURE — 770020 HCHG ROOM/CARE - TELE (206)

## 2023-04-27 PROCEDURE — 99233 SBSQ HOSP IP/OBS HIGH 50: CPT | Mod: 25 | Performed by: STUDENT IN AN ORGANIZED HEALTH CARE EDUCATION/TRAINING PROGRAM

## 2023-04-27 PROCEDURE — A9270 NON-COVERED ITEM OR SERVICE: HCPCS | Performed by: INTERNAL MEDICINE

## 2023-04-27 PROCEDURE — 99497 ADVNCD CARE PLAN 30 MIN: CPT | Performed by: STUDENT IN AN ORGANIZED HEALTH CARE EDUCATION/TRAINING PROGRAM

## 2023-04-27 PROCEDURE — 700102 HCHG RX REV CODE 250 W/ 637 OVERRIDE(OP): Performed by: INTERNAL MEDICINE

## 2023-04-27 PROCEDURE — 81001 URINALYSIS AUTO W/SCOPE: CPT

## 2023-04-27 PROCEDURE — 92610 EVALUATE SWALLOWING FUNCTION: CPT

## 2023-04-27 PROCEDURE — 99223 1ST HOSP IP/OBS HIGH 75: CPT | Performed by: PHYSICAL MEDICINE & REHABILITATION

## 2023-04-27 PROCEDURE — 99231 SBSQ HOSP IP/OBS SF/LOW 25: CPT | Performed by: NURSE PRACTITIONER

## 2023-04-27 PROCEDURE — 83735 ASSAY OF MAGNESIUM: CPT

## 2023-04-27 RX ORDER — POTASSIUM CHLORIDE 20 MEQ/1
40 TABLET, EXTENDED RELEASE ORAL ONCE
Status: COMPLETED | OUTPATIENT
Start: 2023-04-27 | End: 2023-04-27

## 2023-04-27 RX ORDER — CYCLOBENZAPRINE HCL 10 MG
5 TABLET ORAL ONCE
Status: COMPLETED | OUTPATIENT
Start: 2023-04-27 | End: 2023-04-27

## 2023-04-27 RX ORDER — MIRTAZAPINE 15 MG/1
15 TABLET, FILM COATED ORAL NIGHTLY
COMMUNITY

## 2023-04-27 RX ORDER — LIDOCAINE 50 MG/G
1 PATCH TOPICAL EVERY 24 HOURS
Status: DISCONTINUED | OUTPATIENT
Start: 2023-04-27 | End: 2023-05-03 | Stop reason: HOSPADM

## 2023-04-27 RX ADMIN — OXYCODONE 5 MG: 5 TABLET ORAL at 13:57

## 2023-04-27 RX ADMIN — Medication 100 MG: at 05:49

## 2023-04-27 RX ADMIN — FOLIC ACID 1 MG: 1 TABLET ORAL at 05:50

## 2023-04-27 RX ADMIN — DOCUSATE SODIUM 50 MG AND SENNOSIDES 8.6 MG 2 TABLET: 8.6; 5 TABLET, FILM COATED ORAL at 18:10

## 2023-04-27 RX ADMIN — THERA TABS 1 TABLET: TAB at 05:49

## 2023-04-27 RX ADMIN — DOCUSATE SODIUM 50 MG AND SENNOSIDES 8.6 MG 2 TABLET: 8.6; 5 TABLET, FILM COATED ORAL at 05:49

## 2023-04-27 RX ADMIN — POTASSIUM CHLORIDE 40 MEQ: 1500 TABLET, EXTENDED RELEASE ORAL at 00:34

## 2023-04-27 RX ADMIN — LIDOCAINE 1 PATCH: 50 PATCH TOPICAL at 18:10

## 2023-04-27 RX ADMIN — CYCLOBENZAPRINE 5 MG: 10 TABLET, FILM COATED ORAL at 21:29

## 2023-04-27 RX ADMIN — OXYCODONE 5 MG: 5 TABLET ORAL at 21:29

## 2023-04-27 RX ADMIN — OXYCODONE 5 MG: 5 TABLET ORAL at 00:02

## 2023-04-27 RX ADMIN — BRIMONIDINE TARTRATE 1 DROP: 2 SOLUTION OPHTHALMIC at 05:50

## 2023-04-27 ASSESSMENT — ENCOUNTER SYMPTOMS
HEADACHES: 0
SPUTUM PRODUCTION: 0
FOCAL WEAKNESS: 0
DOUBLE VISION: 0
WEAKNESS: 1
PHOTOPHOBIA: 0
GASTROINTESTINAL NEGATIVE: 1
PSYCHIATRIC NEGATIVE: 1
COUGH: 0
NERVOUS/ANXIOUS: 0
FEVER: 0
MYALGIAS: 1
NAUSEA: 0
HALLUCINATIONS: 0
BACK PAIN: 0
VOMITING: 0
ORTHOPNEA: 0
FALLS: 1
HEMOPTYSIS: 0
PALPITATIONS: 0
NEUROLOGICAL NEGATIVE: 1
SPEECH CHANGE: 0
TREMORS: 0
POLYDIPSIA: 0
FLANK PAIN: 0
HEARTBURN: 0
BLURRED VISION: 0
CHILLS: 0
RESPIRATORY NEGATIVE: 1
DIZZINESS: 0
BRUISES/BLEEDS EASILY: 0
NECK PAIN: 0
WEIGHT LOSS: 0

## 2023-04-27 ASSESSMENT — LIFESTYLE VARIABLES
NAUSEA AND VOMITING: NO NAUSEA AND NO VOMITING
TREMOR: NO TREMOR
HEADACHE, FULLNESS IN HEAD: NOT PRESENT
PAROXYSMAL SWEATS: NO SWEAT VISIBLE
AGITATION: SOMEWHAT MORE THAN NORMAL ACTIVITY
HEADACHE, FULLNESS IN HEAD: NOT PRESENT
AUDITORY DISTURBANCES: NOT PRESENT
TREMOR: NO TREMOR
VISUAL DISTURBANCES: NOT PRESENT
ORIENTATION AND CLOUDING OF SENSORIUM: ORIENTED AND CAN DO SERIAL ADDITIONS
TREMOR: NO TREMOR
VISUAL DISTURBANCES: NOT PRESENT
SUBSTANCE_ABUSE: 0
AGITATION: NORMAL ACTIVITY
ANXIETY: NO ANXIETY (AT EASE)
PAROXYSMAL SWEATS: NO SWEAT VISIBLE
HEADACHE, FULLNESS IN HEAD: NOT PRESENT
ORIENTATION AND CLOUDING OF SENSORIUM: ORIENTED AND CAN DO SERIAL ADDITIONS
TOTAL SCORE: 3
AGITATION: NORMAL ACTIVITY
PAROXYSMAL SWEATS: NO SWEAT VISIBLE
DO YOU DRINK ALCOHOL: YES
AUDITORY DISTURBANCES: NOT PRESENT
VISUAL DISTURBANCES: NOT PRESENT
NAUSEA AND VOMITING: NO NAUSEA AND NO VOMITING
ANXIETY: *
ANXIETY: *
TOTAL SCORE: 2
NAUSEA AND VOMITING: NO NAUSEA AND NO VOMITING
AGITATION: SOMEWHAT MORE THAN NORMAL ACTIVITY
PAROXYSMAL SWEATS: NO SWEAT VISIBLE
TREMOR: NO TREMOR
AUDITORY DISTURBANCES: NOT PRESENT
PAROXYSMAL SWEATS: NO SWEAT VISIBLE
TOTAL SCORE: 2
ANXIETY: MILDLY ANXIOUS
HEADACHE, FULLNESS IN HEAD: NOT PRESENT
AUDITORY DISTURBANCES: NOT PRESENT
NAUSEA AND VOMITING: NO NAUSEA AND NO VOMITING
AUDITORY DISTURBANCES: NOT PRESENT
ORIENTATION AND CLOUDING OF SENSORIUM: ORIENTED AND CAN DO SERIAL ADDITIONS
TOTAL SCORE: 0
ANXIETY: MILDLY ANXIOUS
VISUAL DISTURBANCES: NOT PRESENT
AGITATION: SOMEWHAT MORE THAN NORMAL ACTIVITY
TREMOR: NO TREMOR
TOTAL SCORE: 2
VISUAL DISTURBANCES: NOT PRESENT
ORIENTATION AND CLOUDING OF SENSORIUM: ORIENTED AND CAN DO SERIAL ADDITIONS
HEADACHE, FULLNESS IN HEAD: NOT PRESENT
ORIENTATION AND CLOUDING OF SENSORIUM: ORIENTED AND CAN DO SERIAL ADDITIONS
NAUSEA AND VOMITING: NO NAUSEA AND NO VOMITING

## 2023-04-27 ASSESSMENT — COGNITIVE AND FUNCTIONAL STATUS - GENERAL
HELP NEEDED FOR BATHING: A LOT
DRESSING REGULAR UPPER BODY CLOTHING: A LITTLE
SUGGESTED CMS G CODE MODIFIER MOBILITY: CM
TOILETING: A LOT
MOVING TO AND FROM BED TO CHAIR: UNABLE
MOBILITY SCORE: 8
DAILY ACTIVITIY SCORE: 17
CLIMB 3 TO 5 STEPS WITH RAILING: TOTAL
DRESSING REGULAR LOWER BODY CLOTHING: A LOT
TURNING FROM BACK TO SIDE WHILE IN FLAT BAD: UNABLE
SUGGESTED CMS G CODE MODIFIER DAILY ACTIVITY: CK
STANDING UP FROM CHAIR USING ARMS: A LOT
MOVING FROM LYING ON BACK TO SITTING ON SIDE OF FLAT BED: UNABLE
WALKING IN HOSPITAL ROOM: A LOT

## 2023-04-27 ASSESSMENT — ACTIVITIES OF DAILY LIVING (ADL): TOILETING: INDEPENDENT

## 2023-04-27 ASSESSMENT — PAIN DESCRIPTION - PAIN TYPE
TYPE: ACUTE PAIN
TYPE: ACUTE PAIN

## 2023-04-27 ASSESSMENT — GAIT ASSESSMENTS: GAIT LEVEL OF ASSIST: UNABLE TO PARTICIPATE

## 2023-04-27 NOTE — CONSULTS
[facility] Neurosurgery      Author: Bay Maynard M.D. Date & Time created: 2023  8:21 PM  Called 23 1700 seen 1730  Referring MD:  Dr. Hull  Reason for referral:  right chronic SDH     Interval History   82 year old female who fell yesterday.  She went to [facility] for right hip pain.  Head CT demonstrated a right chronic SDH.  She denies headache, neck pain (but has neck tightness), nausea, vomiting, arm pain, numbness, tingling.  She has right hip pain.  She was drinking last night when she fell.      ROS per h/p    Physical Exam    Awake, alert   Speech fluent appropriate  In no apparent distress  Affect, mood appropriate  Oriented  Pupils 3 mm midline, reactive.  Conjugate gaze.  Visual fields full to confrontation  Face symmetric  Facial sensation intact light touch  Hearing intact to conversation, bilaterally  Motor:  Bilateral bicep, tricep,                 IP, thigh adduction, thigh abduction, quadriceps, hamstrings, dorsiflexion 5/5 no pronator drift  Sensation:  Intact touch face, neck, torso, four extremities  Reflex:  No Kate's no clonus  Finger-nose-finger, JORY unremarkable      Patient Active Problem List    Diagnosis Date Noted    Subdural hematoma (HCC) 2023    SDH (subdural hematoma) (HCC) 2023    Acute alcoholic intoxication with delirium (HCC) 2020       Temp (24hrs), Av.4 °C (99.3 °F), Min:37.4 °C (99.3 °F), Max:37.4 °C (99.3 °F)    Pulse: 99, Respiration: 15, Blood Pressure : 118/56, Weight: 45.4 kg (100 lb), Pulse Oximetry: 92 %     Date 23 07 - 23 0659   Shift 4950-7810 4831-7510 8366-8375 24 Hour Total   INTAKE   I.V.  0  0     Pre-Hospital Volume  0  0     Trauma Resuscitation Volume  0  0   Shift Total  0  0   OUTPUT   Other  0  0     Pre-Hospital Output  0  0     Trauma Resuscitation Output  0  0   Shift Total  0  0   NET  0  0         Intake/Output Summary (Last 24 hours) at 2023  Last data filed at 2023 4884  Gross per  24 hour   Intake 0 ml   Output 0 ml   Net 0 ml             senna-docusate  2 Tablet      And    polyethylene glycol/lytes  1 Packet      And    magnesium hydroxide  30 mL      And    bisacodyl  10 mg      acetaminophen  650 mg      Pharmacy Consult Request  1 Each      oxyCODONE immediate-release  5 mg      Or    oxyCODONE immediate-release  10 mg      Or    morphine injection  4 mg      ondansetron  4 mg      ondansetron  4 mg      Respiratory Therapy Consult        NS        LORazepam  2 mg      acetaminophen  650 mg      Or    acetaminophen  650 mg      ondansetron  4 mg      Or    ondansetron  4 mg      MD Alert...Adult ICU Electrolyte Replacement per Pharmacy        labetalol  10 mg      Or    labetalol  200 mg      LORazepam  0.5 mg      LORazepam  1 mg      Or    LORazepam  0.5 mg      LORazepam  2 mg      Or    LORazepam  1 mg      LORazepam  3 mg      Or    LORazepam  1.5 mg      LORazepam  4 mg      Or    LORazepam  2 mg      [START ON 4/27/2023] thiamine  100 mg      And    [START ON 4/27/2023] multivitamin  1 Tablet      And    [START ON 4/27/2023] folic acid  1 mg      [START ON 4/27/2023] brimonidine  1 Drop         Recent Labs     04/26/23  1714   WBC 4.3*   RBC 3.19*   HEMOGLOBIN 10.7*   HEMATOCRIT 32.0*   .3*   MCH 33.5*   PLATELETCT 108*     Recent Labs     04/26/23  1714   SODIUM 139   POTASSIUM 3.3*   CHLORIDE 104   CO2 23   GLUCOSE 85   BUN 11   CREATININE 0.48*   CALCIUM 7.0*     Recent Labs     04/26/23  1714   APTT 29.3   INR 1.05     AP:  82 year old female with 6 mm right chronic SDH (personal measurement) with severe diffuse cerebral atrophy.  Given the degree of cerebral atrophy, the chronic hematoma barely touches the cortical surface; there is no midline shift, sulci/cisterns very widely patent.  No Neurosurgical intervention at this time  Needs evaluation/treatment drinking disorder  Can follow up in my clinic in 4-6 weeks with repeat head CT no contrast.  No anticoagulants,  antiplatelet medication unless clear clinical indication exists   Keep well hydrated  Keep eunatremic  Neurosurgery will sign off.  Please call with any questions

## 2023-04-27 NOTE — DISCHARGE PLANNING
[facility] Transitional Care Coordination    Referral from:  Omaira  Insurance Provider on Facesheet:[facility]  Potential Rehab Diagnosis: SDH    Chart review indicates patient may have on going medical management and may have therapy needs to possibly meet inpatient rehab facility criteria with the goal of returning to community.    D/C support: non listed on face sheet.  Will need to verify d/c support      Physiatry consultation pended per protocol.   Will need therapy notes when medically appropriate.         Thank you for the referral.

## 2023-04-27 NOTE — ED NOTES
Received bedside report from LIZETT Osman to assume care of pt. At this time.  Pt. Resting on gurney in position of comfort. Pt. Denies any pain at this time.   Pt. Reports that last night she fell and hit her head after drinking etoh.  Pt. States after that she was assisted to the bed and went to sleep.  Pt. Reports that this AM she fell again after losing her balance but denies injury with that fall.  Pt. Able to answer all questions appropriately.  MAGED x 4 equally.

## 2023-04-27 NOTE — ASSESSMENT & PLAN NOTE
R hip pain with limited ROM, tenderness. Fall  PT/OT  R hip xray ordered, I reviewed the right hip x-ray independently, no sign of acute dislocation or fracture  Multimodal pain managements including po and iv prn narcotics  Ordered Flexeril as needed  Ordered lidocaine patch for pain control with p.o. oxycodone as needed

## 2023-04-27 NOTE — DIETARY
"Nutrition services: Day 1 of admit.  Lizzie Lema is a 82 y.o. female with admitting DX of SDH (subdural hematoma)  Consult received for low BMI    Assessment:  Height: 160 cm (5' 2.99\")  Weight: 43.3 kg (95 lb 7.4 oz)  Body mass index is 16.91 kg/m²., BMI classification: Underweight  Diet/Intake: Regular  PO intake 25-50%x 1 meal.    Evaluation:   DX: Hypokalemia, SDH, alcohol abuse with dependence, pancytopenia  PMH: glaucoma and stroke  Labs: K+ 3.3, Creatinine 0.48, corrected calcium 8,   Meds: Zofran PRN, oxycodone, BM protocol, thiamine, MVI, folic acid, Kdur (completed today)  Skin: no p/u, no edema  +BM: 4/26  Visited pt at bedside. Pt said that her appetite has decreased x 3 months. She said that she doesn't eat as she gets older. Per pt, she typically eats 2 meals/day (skips breakfast).  Pt stated that her UBW is 115lbs. Per pt, wt loss was unplanned. Pt stated that she lost 15lbs recently. According to her stated UBW and current wt, pt has lost 20lbs x 3-4 months, a 20.8% decrease in wt (severe). 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   Per nutrition focused physical exam (NFPE), pt with mild muscle wasting in temporal area, severe protruding clavicles, moderate fat loss in acromion area, mild fat loss in upper arm area, and moderate fat loss in hand region. Little to no fat wasting in orbital region.     Malnutrition Risk: Per ASPEN guidelines, pt with severe malnutrition in the context of chronic illness likely r/t alcohol abuse aeb wt loss of 20.8% x 3-4 months in the past year and severe muscle wasting in clavicle region, mild muscle wasting in temporal area, moderate fat loss via acromion area and hand region per NFPE.    Recommendations/Plan:  Add Boost+ BID   Encourage intake of 50%  Document intake of all meals as % taken in ADL's to provide interdisciplinary communication across all " shifts.   Monitor weight.  Nutrition rep will continue to see patient for ongoing meal and snack preferences.     RD following.

## 2023-04-27 NOTE — THERAPY
Speech Language Pathology   Clinical Swallow Evaluation     Patient Name: Lizzie Lema  AGE:  82 y.o., SEX:  female  Medical Record #: [id number]  Date of Service: 4/27/2023      HPI/PMHx  82 y.o. female with past medical history of remote CVA 10 years ago, ongoing alcohol abuse, noncompliance with medications, who presented 4/26/2023 after ground-level fall yesterday, planing of right hip pain. She presented at [facility], where she was evaluated with CT head showing right-sided 9 mm subdural hematoma.  Patient transferred here for neurosurgery consult.  No fracture of right hip.    General Information:  Vitals  O2 Delivery Device: None - Room Air        Prior Living Situation & Level of Function:  Prior Services: None  Housing / Facility: [address] House  Lives with - Patient's Self Care Capacity: Other (Comments) (boyfriend)  Communication: WNL  Swallowing: WNL       Oral Mechanism Evaluation:  Dentition: Some missing dentition (in back, also has dental implants)   Facial Symmetry: Equal  Facial Sensation: Equal     Labial Observations: WFL   Lingual Observations: Midline     Laryngeal Function:  Secretion Management: Adequate  Voice Quality: WFL  Cough: Perceptually WNL     Assessment  Current Method of Nutrition: Oral diet (reg/thins per MD)  Positioning: Fernando's (60-90 degrees)  Bolus Administration: Patient  O2 Delivery Device: None - Room Air  Factor(s) Affecting Performance: None  Tracheostomy : No     Swallowing Trials:  Swallowing Trials  Thin Liquid (TN0): WFL  Soft & Bite Sized (SB6): WFL  Easy to Chew (EC7): WFL  Regular (RG7): WFL    Comments: Pt awake and alert, eager to participate.  Pt reporting no difficulty with PO intake, and reports no history of dysphagia.  Pt seen at breakfast with a regular/thin liquids meal tray.  Pt self fed without difficulty, and consumed all consistencies on her tray with no overt s/sx of aspiration.  Mastication was minimally prolonged with min  oral residue noted on solids, however this was reduced and in some cases cleared using a liquid wash and/or tongue sweep.  Recommend to continue regular diet with thins, with use of swallow precautions.  SLP will continue to follow to ensure diet tolerance and to complete cog eval as appropriate.      Recommendations  Diet Consistency: Regular/Thins  Instrumentation: None indicated at this time  Medication: As tolerated  Supervision: Distant supervision - check on patient 2-3 times per meal  Positioning: Fully upright and midline during oral intake  Risk Management : Small bites/sips, Slow rate of intake, Alternate bites and sips  Oral Care: BID       SLP Treatment Plan  Treatment Plan: Dysphagia Treatment, Patient/Family/Caregiver Training  SLP Frequency: 3x Per Week  Estimated Duration: Until Therapy Goals Met    Anticipated Discharge Needs  Discharge Recommendations: Anticipate that the patient will have no further speech therapy needs after discharge from the hospital          Patient / Family Goals  Patient / Family Goal #1: Water  Short Term Goals  Short Term Goal # 1: Pt will consume regular diet with thins, with no overt s/sx of aspiration.      Tessie Ramirez, SLP

## 2023-04-27 NOTE — H&P
Hospital Medicine History & Physical Note    Date of Service  4/26/2023    Primary Care Physician  Pcp Pt States None    Consultants  Neurosurgery Dr. Maynard    Code Status  Full Code    Chief Complaint  Chief Complaint   Patient presents with    Trauma Green     Pt had a GLF last night. Trauma green transfer for SDH, acute vs chronic. Pt arrives GCS 15 complaining of head pain and right hip pain.        History of Presenting Illness  Lizzie Lema is a 82 y.o. female with past medical history of remote CVA 10 years ago, ongoing alcohol abuse, noncompliance with medications, who presented 4/26/2023 after ground-level fall yesterday, planing of right hip pain.  She presented at [facility], where she was evaluated with CT head showing right-sided 9 mm subdural hematoma.  Patient transferred here for neurosurgery consult.  According to her, she had half bottle of whiskey yesterday.  She went to the bathroom, and when was getting up out of toilet lost balance, fell, hit her head and right side of the hip.  Fortunately there is no fracture of the right hip.  Patient further commented that she has been having frequent falls due to her drinking habits.  She has half bottle of whiskey every 3 days.   Trauma surgery and neurosurgery consulted.  Requested admission to medicine for observation.  Avoid anticoagulation and antiplatelets, keep you natremia.  Otherwise neurosurgery signed off.  No neurosurgical intervention indicated.    I discussed the plan of care with patient.    Review of Systems  Review of Systems   Constitutional:  Negative for chills, fever and weight loss.   HENT:  Negative for ear pain, hearing loss and tinnitus.    Eyes:  Negative for blurred vision, double vision and photophobia.   Respiratory:  Negative for cough, hemoptysis and sputum production.    Cardiovascular:  Negative for chest pain, palpitations and orthopnea.   Gastrointestinal:  Negative for heartburn, nausea and  vomiting.   Genitourinary:  Negative for dysuria, flank pain, frequency and hematuria.   Musculoskeletal:  Positive for falls. Negative for back pain, joint pain and neck pain.   Skin:  Negative for itching and rash.   Neurological:  Negative for tremors, speech change, focal weakness and headaches.   Endo/Heme/Allergies:  Negative for environmental allergies and polydipsia. Does not bruise/bleed easily.   Psychiatric/Behavioral:  Negative for hallucinations and substance abuse. The patient is not nervous/anxious.      Past Medical History   has a past medical history of Glaucoma and Stroke ([facility]).    Surgical History   has a past surgical history that includes appendectomy.     Family History     Family history reviewed with patient. There is no family history that is pertinent to the chief complaint.     Social History   reports that she has never smoked. She has never used smokeless tobacco. She reports current alcohol use. She reports that she does not use drugs.    Allergies  No Known Allergies    Medications  Prior to Admission Medications   Prescriptions Last Dose Informant Patient Reported? Taking?   brimonidine (ALPHAGAN) 0.15 % Solution FEW DAYS AGO at K Patient Yes No   Sig: Administer 1 Drop into both eyes every day.      Facility-Administered Medications: None       Physical Exam  Temp:  [37.4 °C (99.3 °F)] 37.4 °C (99.3 °F)  Pulse:  [] 99  Resp:  [13-16] 15  BP: ()/(56-66) 118/56  SpO2:  [92 %-99 %] 92 %  Blood Pressure : 118/56   Temperature: 37.4 °C (99.3 °F)   Pulse: 99   Respiration: 15   Pulse Oximetry: 92 %       Physical Exam  Vitals and nursing note reviewed.   Constitutional:       General: She is not in acute distress.     Appearance: Normal appearance.   HENT:      Head: Normocephalic and atraumatic.      Nose: Nose normal.      Mouth/Throat:      Mouth: Mucous membranes are moist.   Eyes:      Extraocular Movements: Extraocular movements intact.      Pupils: Pupils are equal,  round, and reactive to light.   Cardiovascular:      Rate and Rhythm: Normal rate and regular rhythm.   Pulmonary:      Effort: Pulmonary effort is normal.      Breath sounds: Normal breath sounds.   Abdominal:      General: Abdomen is flat. There is no distension.      Tenderness: There is no abdominal tenderness.   Musculoskeletal:         General: No swelling or deformity. Normal range of motion.      Cervical back: Normal range of motion and neck supple.   Skin:     General: Skin is warm and dry.   Neurological:      General: No focal deficit present.      Mental Status: She is alert and oriented to person, place, and time.   Psychiatric:         Mood and Affect: Mood normal.         Behavior: Behavior normal.       Laboratory:  Recent Labs     04/26/23  1714   WBC 4.3*   RBC 3.19*   HEMOGLOBIN 10.7*   HEMATOCRIT 32.0*   .3*   MCH 33.5*   MCHC 33.4*   RDW 45.2   PLATELETCT 108*   MPV 9.3     Recent Labs     04/26/23  1714   SODIUM 139   POTASSIUM 3.3*   CHLORIDE 104   CO2 23   GLUCOSE 85   BUN 11   CREATININE 0.48*   CALCIUM 7.0*     Recent Labs     04/26/23  1714   ALTSGPT 12   ASTSGOT 31   ALKPHOSPHAT 60   TBILIRUBIN 0.9   GLUCOSE 85     Recent Labs     04/26/23  1714   APTT 29.3   INR 1.05     No results for input(s): NTPROBNP in the last 72 hours.      No results for input(s): TROPONINT in the last 72 hours.    Imaging:  OUTSIDE IMAGES-CT HEAD   Final Result      CT-FOREIGN FILM CAT SCAN   Final Result      [id number] FILM X-RAY   Final Result              Assessment/Plan:  Justification for Admission Status  I anticipate this patient will require at least two midnights for appropriate medical management, necessitating inpatient admission because subdural hematoma    Patient will need a Telemetry bed on NEUROSURGERY service .  The need is secondary to subdural hematoma.    * SDH (subdural hematoma) (HCC)- (present on admission)  Assessment & Plan  Traumatic, secondary to head injury from  ground-level fall.  Neurosurgery consulted: Conservative management recommended  Avoid antiplatelets and anticoagulation  Maintain normal sodium and blood pressure  PT OT evaluation for frequent falls    Hypokalemia  Assessment & Plan  P.o. replacement ordered    Pancytopenia  Assessment & Plan  Platelets 108  WBC 4.3  Hemoglobin 10.7.  .3  ?  Secondary to alcohol abuse    Alcohol abuse with dependence  Assessment & Plan  Monitor for withdrawal  Alcohol cessation counseling        VTE prophylaxis: SCDs/TEDs

## 2023-04-27 NOTE — ED NOTES
Pt. Unable to void using purwick at this time. Pt. Denies need to void at this time, bedside commode offered. Pt. Verbalized understanding to call for assistance if she changes her mind, verbalized understanding.

## 2023-04-27 NOTE — ASSESSMENT & PLAN NOTE
Traumatic, secondary to head injury from ground-level fall.    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 No midline shift  Discussed with neurosurgery and trauma surgery  Conservative management recommended. Avoid antiplatelets and anticoagulation.  Discussed with neurosurgery, no indication for Keppra  Maintain normal sodium and blood pressure  PT/OT  Fall precautions  stable, continue working for placement

## 2023-04-27 NOTE — ED NOTES
Pt. Reports she has not eaten all day and is requesting food.  ERP ok for pt. To have food.  Pt. Passed bedside swallow eval so turkey sandwich given per pt. Request.

## 2023-04-27 NOTE — H&P
TRAUMA HISTORY AND PHYSICAL    CHIEF COMPLAINT:     HISTORY OF PRESENT ILLNESS: The patient is a 82 year old female who fell yesterday.   The patient was triaged as a consult level activation after transfer from [facility].  CT shows a 9 mm subdural hematoma.  She complains of headache.  GCS is 15 and stable. Neurosurgery has been consulted and has requested fisher care.        PAST MEDICAL HISTORY:  has a past medical history of Glaucoma and Stroke (HCC).     PAST SURGICAL HISTORY:  has a past surgical history that includes appendectomy.     ALLERGIES: No Known Allergies     CURRENT MEDICATIONS:   Home Medications       Reviewed by [place] (Pharmacy Tech) on 04/26/23 at 1822  Med List Status: Complete     Medication Last Dose Status   brimonidine (ALPHAGAN) 0.15 % Solution FEW DAYS AGO Active                    FAMILY HISTORY: No family history on file.     SOCIAL HISTORY:   Social History     Tobacco Use    Smoking status: Never    Smokeless tobacco: Never    Tobacco comments:     daily   Vaping Use    Vaping Use: Never used   Substance and Sexual Activity    Alcohol use: Yes    Drug use: Never    Sexual activity: Not on file       REVIEW OF SYSTEMS: Comprehensive review of systems is negative with the   exception of the aforementioned HPI, PMH, and PSH elements in accordance with CMS guidelines.     PHYSICAL EXAMINATION:     GENERAL: No distress  VITAL SIGNS: BP (!) 159/66   Pulse 100   Temp 37.4 °C (99.3 °F) (Temporal)   Resp 15   Ht 1.524 m (5')   Wt 45.4 kg (100 lb)   SpO2 93%   HEAD AND NECK: Pupils:  Equal and Reactive  Facial:  Symmetrical.    NECK: No JVD. Trachea midline. Cervical tenderness is  absent   CHEST: Breath sounds are equal. No sternal tenderness.  No  lateral rib tenderness.  CARDIOVASCULAR: Regular rhythm  ABDOMEN: Soft, no tenderness guarding or peritoneal findings.   EXTREMITIES: Examination of the upper and lower extremities : No gross long bone or joint deformity.    NEUROLOGIC:  Fide Coma Score is  15 .  No gross motor or sensory deficit.     LABORATORY VALUES:   Recent Labs     04/26/23  1714   WBC 4.3*   RBC 3.19*   HEMOGLOBIN 10.7*   HEMATOCRIT 32.0*   .3*   MCH 33.5*   MCHC 33.4*   RDW 45.2   PLATELETCT 108*   MPV 9.3     Recent Labs     04/26/23  1714   SODIUM 139   POTASSIUM 3.3*   CHLORIDE 104   CO2 23   GLUCOSE 85   BUN 11   CREATININE 0.48*   CALCIUM 7.0*     Recent Labs     04/26/23  1714   ASTSGOT 31   ALTSGPT 12   TBILIRUBIN 0.9   ALKPHOSPHAT 60   GLOBULIN 2.3   INR 1.05     Recent Labs     04/26/23  1714   APTT 29.3   INR 1.05        IMAGING:   OUTSIDE IMAGES-CT HEAD   Final Result      CT-FOREIGN FILM CAT SCAN   Final Result      [id number] FILM X-RAY   Final Result          IMPRESSION AND PLAN:  Subdural hematoma (HCC)  9mm  GCS 15  BIG 3   Admit to fisher :  Medical service         ____________________________________   Koko Marr MD, FACS      DD: 4/26/2023   DT: 6:14 PM

## 2023-04-27 NOTE — THERAPY
Physical Therapy   Initial Evaluation     Patient Name: Lizzie Lema  Age:  82 y.o., Sex:  female  Medical Record #: [id number]  Today's Date: 4/27/2023          Assessment    Patient is 82 y.o. female with GLF and resulting SDH. PMHx includes CVA, alcohol abuse, medication noncompliance.     Pt is agreeable to participation in therapy session and tolerates session poorly; is limited by significant pain with all mobility; attempts supine to sit transfers but is unable due to sharp anterior R hip pain, requires max assist for supine to/from sit, min assist for sit to stand and unable to bear weight through R LE during standing, unable to side step with FWW at EOB due to pain. Pt reports she can go home at this functional level, reports boyfriend will carry her into home.     Pt will benefit from continued PT services in acute setting, as well as in post acute setting. If pt to DC home recommend FWW for DC DME.         Plan    Physical Therapy Initial Treatment Plan   Treatment Plan : (P) Bed Mobility, Gait Training, Neuro Re-Education / Balance, Stair Training, Therapeutic Activities, Therapeutic Exercise, Self Care / Home Evaluation  Treatment Frequency: (P) 3 Times per Week  Duration: (P) Until Therapy Goals Met    DC Equipment Recommendations: (P) Front-Wheel Walker  Discharge Recommendations: (P) Recommend post-acute placement for additional physical therapy services prior to discharge home        Objective       04/27/23 1257   Charge Group   PT Evaluation PT Evaluation Mod   Total Time Spent   PT Total Time Yes   PT Evaluation Time Spent (Mins) 15   PT Therapeutic Activities Time Spent (Mins) 10    Services   Is patient using  services for this encounter? No   Initial Contact Note    Initial Contact Note Order Received and Verified, Physical Therapy Evaluation in Progress with Full Report to Follow.   Vitals   O2 Delivery Device None - Room Air   Pain 0 - 10 Group   Location Hip    Location Orientation Right   Description Sharp   Therapist Pain Assessment During Activity   Prior Living Situation   Prior Services Home-Independent   Housing / Facility 1 Story House   Steps Into Home 1   Steps In Home 0   Bathroom Set up Grab Bars;Bathtub / Shower Combination   Equipment Owned Grab Bar(s) In Tub / Shower   Lives with - Patient's Self Care Capacity Other (Comments)  (boyfriend)   Prior Level of Functional Mobility   Bed Mobility Independent   Transfer Status Independent   Ambulation Independent   Ambulation Distance household   Assistive Devices Used None   Stairs Independent   History of Falls   History of Falls Yes   Date of Last Fall 04/26/23   Cognition    Cognition / Consciousness WDL   Level of Consciousness Alert   Comments pleasant and cooperative, limited by pain   Strength Upper Body   Upper Body Strength  WDL   Strength Lower Body   Lower Body Strength  X   Gross Strength Generalized Weakness, Equal Bilaterally   Comments limited by pain, unable to fully assess   Coordination Upper Body   Coordination WDL   Coordination Lower Body    Coordination Lower Body  X   Comments affected by pain   Balance Assessment   Sitting Balance (Static) Fair   Sitting Balance (Dynamic) Fair   Standing Balance (Static) Poor +   Standing Balance (Dynamic) Poor +   Weight Shift Sitting Fair   Weight Shift Standing Poor   Bed Mobility    Supine to Sit Maximal Assist   Sit to Supine Maximal Assist   Scooting Maximal Assist   Gait Analysis   Gait Level Of Assist Unable to Participate   Comments limited by R hip pain   Functional Mobility   Sit to Stand Minimal Assist   Mobility supine to/from sit EOB, sit to stand   How much difficulty does the patient currently have...   Turning over in bed (including adjusting bedclothes, sheets and blankets)? 1   Sitting down on and standing up from a chair with arms (e.g., wheelchair, bedside commode, etc.) 1   Moving from lying on back to sitting on the side of the bed?  1   How much help from another person does the patient currently need...   Moving to and from a bed to a chair (including a wheelchair)? 2   Need to walk in a hospital room? 2   Climbing 3-5 steps with a railing? 1   6 clicks Mobility Score 8   Activity Tolerance   Sitting in Chair NT   Sitting Edge of Bed 10 min   Standing 2 min total   Short Term Goals    Short Term Goal # 1 supine to/from sit EOB w/o use of railings, min assist in 6 visits   Short Term Goal # 2 sit to stand from EOB with FWW and SBA in 6 visits   Short Term Goal # 3 ambulation with FWW/LRD and SBA 50 feet in 6 visits   Short Term Goal # 4 up/down single step with LRD and SBA in 6 visits   Education Group   Education Provided Role of Physical Therapist;Transfer Status   Role of Physical Therapist Patient Response Patient;Acceptance;Explanation;Verbal Demonstration   Transfer Status Patient Response Patient;Acceptance;Explanation;Verbal Demonstration;Action Demonstration   Physical Therapy Initial Treatment Plan    Treatment Plan  Bed Mobility;Gait Training;Neuro Re-Education / Balance;Stair Training;Therapeutic Activities;Therapeutic Exercise;Self Care / Home Evaluation   Treatment Frequency 3 Times per Week   Duration Until Therapy Goals Met   Problem List    Problems Pain;Impaired Bed Mobility;Impaired Transfers;Impaired Ambulation;Functional Strength Deficit;Impaired Balance;Decreased Activity Tolerance   Anticipated Discharge Equipment and Recommendations   DC Equipment Recommendations Front-Wheel Walker   Discharge Recommendations Recommend post-acute placement for additional physical therapy services prior to discharge home   Interdisciplinary Plan of Care Collaboration   IDT Collaboration with  Nursing;Occupational Therapist   Patient Position at End of Therapy In Bed;Bed Alarm On;Call Light within Reach;Tray Table within Reach;Phone within Reach   Collaboration Comments RN updated   Session Information   Date / Session Number  4/27 -1  (1/3; 5/3)     Nataliya Valle, DPT

## 2023-04-27 NOTE — THERAPY
"Occupational Therapy   Initial Evaluation     Patient Name: Lizzie Lema  Age:  82 y.o., Sex:  female  Medical Record #: [id number]  Today's Date: 4/27/2023     Precautions  Precautions: (P) Fall Risk    Assessment    Patient is 82 y.o. female admitted following GLF, ETOH, right hip pain. Pt normally independent with functional mobility and ADLs living in a SLH with boyfriend. Pt required modA for bed mobility and lower body ADLs due to right hip pain, pt able to stand with Debbie and FWW but little to no weight placed through R hip due to pain. Pt having severe pain with all right hip movements, pt unable to tolerate OOB activity, will recommend post-acute placement.    Plan    Occupational Therapy Initial Treatment Plan   Treatment Interventions: (P) Self Care / Activities of Daily Living, Adaptive Equipment, Manual Therapy Techniques, Neuro Re-Education / Balance, Therapeutic Exercises, Therapeutic Activity  Treatment Frequency: (P) 3 Times per Week  Duration: (P) Until Therapy Goals Met    DC Equipment Recommendations: (P) Unable to determine at this time  Discharge Recommendations: (P) Recommend post-acute placement for additional occupational therapy services prior to discharge home     Subjective    \"My hip hurts so bad\"     Objective       04/27/23 1238   Prior Living Situation   Prior Services None   Housing / Facility 1 Story House   Bathroom Set up Walk In Shower   Equipment Owned None   Lives with - Patient's Self Care Capacity Significant Other   Prior Level of ADL Function   Self Feeding Independent   Grooming / Hygiene Independent   Bathing Independent   Dressing Independent   Toileting Independent   Prior Level of IADL Function   Medication Management Independent   Laundry Independent   Kitchen Mobility Independent   Finances Independent   Home Management Independent   Shopping Independent   Prior Level Of Mobility Independent Without Device in Community   Occupation (Pre-Hospital Vocational) " Employed Full Time   History of Falls   History of Falls Yes   Date of Last Fall   (reason for admission)   Precautions   Precautions Fall Risk   Pain 0 - 10 Group   Therapist Pain Assessment During Activity  (R hip pain)   Cognition    Cognition / Consciousness X   Level of Consciousness Alert   Comments limited insight into deficits, otherwise WFL   Active ROM Upper Body   Active ROM Upper Body  WDL   Dominant Hand Right   Strength Upper Body   Upper Body Strength  WDL   Sensation Upper Body   Upper Extremity Sensation  WDL   Upper Body Muscle Tone   Upper Body Muscle Tone  WDL   Neurological Concerns   Neurological Concerns No   Coordination Upper Body   Coordination WDL   Balance Assessment   Sitting Balance (Static) Fair -   Sitting Balance (Dynamic) Poor   Standing Balance (Static) Poor -   Standing Balance (Dynamic) Trace   Weight Shift Sitting Fair   Weight Shift Standing Poor   Comments w/ FWW   Bed Mobility    Supine to Sit Moderate Assist   Sit to Supine Moderate Assist   Scooting Moderate Assist   Rolling Moderate Assist to Rt.   ADL Assessment   Grooming Supervision;Seated   Upper Body Dressing Supervision   Lower Body Dressing Maximal Assist   How much help from another person does the patient currently need...   Putting on and taking off regular lower body clothing? 2   Bathing (including washing, rinsing, and drying)? 2   Toileting, which includes using a toilet, bedpan, or urinal? 2   Putting on and taking off regular upper body clothing? 3   Taking care of personal grooming such as brushing teeth? 4   Eating meals? 4   6 Clicks Daily Activity Score 17   Functional Mobility   Sit to Stand Minimal Assist   Bed, Chair, Wheelchair Transfer Unable to Participate   Toilet Transfers Unable to Participate   Transfer Method Stand Step   Mobility bed mobility, STS x2 at EOB, side steps, returned to supine   Comments w/ FWW   Activity Tolerance   Sitting Edge of Bed 8 min   Standing 2 min total   Short Term  Goals   Short Term Goal # 1 Pt will complete ADL transfers with supervision   Short Term Goal # 2 Pt will complete LB dressing with supervision   Short Term Goal # 3 Pt will complete toileting with supervision   Education Group   Education Provided Role of Occupational Therapist   Role of Occupational Therapist Patient Response Patient;Acceptance;Explanation;Reinforcement Needed   Occupational Therapy Initial Treatment Plan    Treatment Interventions Self Care / Activities of Daily Living;Adaptive Equipment;Manual Therapy Techniques;Neuro Re-Education / Balance;Therapeutic Exercises;Therapeutic Activity   Treatment Frequency 3 Times per Week   Duration Until Therapy Goals Met   Problem List   Problem List Decreased Active Daily Living Skills;Decreased Homemaking Skills;Decreased Activity Tolerance;Decreased Functional Mobility;Safety Awareness Deficits / Cognition;Impaired Postural Control / Balance;Impaired Cognitive Function   Anticipated Discharge Equipment and Recommendations   DC Equipment Recommendations Unable to determine at this time   Discharge Recommendations Recommend post-acute placement for additional occupational therapy services prior to discharge home   Interdisciplinary Plan of Care Collaboration   IDT Collaboration with  Nursing;Physical Therapist   Patient Position at End of Therapy In Bed;Bed Alarm On;Call Light within Reach;Tray Table within Reach;Phone within Reach   Collaboration Comments RN updated

## 2023-04-27 NOTE — PROGRESS NOTES
Hospital Medicine Daily Progress Note    Date of Service  4/27/2023    Chief Complaint  Lizzie Lema is a 82 y.o. female admitted 4/26/2023 with fall    Hospital Course  82 y.o. 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 She presented at [facility], where she was evaluated with CT head showing right-sided 9 mm subdural hematoma.  Patient transferred here for neurosurgery consult.  Neurosurgery was consulted and rec No Neurosurgical intervention at this time. Follow up as outpatient in 4-6 weeks with repeat head CT no contrast. 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 No midline shift  Discussed with neurosurgery and trauma surgery    I have discussed this patient's plan of care and discharge plan at IDT rounds today with Case Management, Nursing, Nursing leadership, and other members of the IDT team.    Consultants/Specialty  neurosurgery and trauma surgery    Code Status  Full Code    Disposition  Patient is not medically cleared for discharge.   Anticipate discharge to to skilled nursing facility.  I have placed the appropriate orders for post-discharge needs.    Review of Systems  Review of Systems   Constitutional:  Positive for malaise/fatigue.   Musculoskeletal:  Positive for falls and joint pain.   Neurological:  Positive for weakness.   All other systems reviewed and are negative.     Physical Exam  Temp:  [36.5 °C (97.7 °F)-37.4 °C (99.3 °F)]  36.5 °C (97.7 °F)  Pulse:  [] 62  Resp:  [13-18] 16  BP: ()/(52-74) 124/59  SpO2:  [91 %-99 %] 95 %    Physical Exam  Vitals and nursing note reviewed.   Constitutional:       Appearance: Normal appearance. She is ill-appearing.   HENT:      Head: Normocephalic and atraumatic.      Nose: Nose normal.      Mouth/Throat:      Pharynx: Oropharynx is clear.   Eyes:      Extraocular Movements: Extraocular movements intact.      Conjunctiva/sclera: Conjunctivae normal.      Pupils: Pupils are equal, round, and reactive to light.   Cardiovascular:      Rate and Rhythm: Normal rate and regular rhythm.      Pulses: Normal pulses.      Heart sounds: Normal heart sounds.   Pulmonary:      Effort: Pulmonary effort is normal.      Breath sounds: Normal breath sounds.   Abdominal:      General: Abdomen is flat. Bowel sounds are normal.      Palpations: Abdomen is soft.   Musculoskeletal:         General: Tenderness (R hip) present.      Cervical back: Normal range of motion and neck supple.      Comments: R hip limited ROM due to pain   Skin:     General: Skin is warm and dry.   Neurological:      General: No focal deficit present.      Mental Status: She is alert and oriented to person, place, and time. Mental status is at baseline.   Psychiatric:         Mood and Affect: Mood normal.         Behavior: Behavior normal.       Fluids    Intake/Output Summary (Last 24 hours) at 4/27/2023 1549  Last data filed at 4/27/2023 0900  Gross per 24 hour   Intake 120 ml   Output 0 ml   Net 120 ml       Laboratory  Recent Labs     04/26/23 1714 04/27/23  0334   WBC 4.3* 3.6*   RBC 3.19* 3.47*   HEMOGLOBIN 10.7* 11.6*   HEMATOCRIT 32.0* 35.3*   .3* 101.7*   MCH 33.5* 33.4*   MCHC 33.4* 32.9*   RDW 45.2 46.7   PLATELETCT 108* 117*   MPV 9.3 9.2     Recent Labs     04/26/23 1714   SODIUM 139   POTASSIUM 3.3*   CHLORIDE 104   CO2 23   GLUCOSE 85   BUN 11   CREATININE 0.48*   CALCIUM 7.0*     Recent Labs     04/26/23 1714    APTT 29.3   INR 1.05         Recent Labs     04/26/23  1714   TRIGLYCERIDE 46   HDL 66   LDL 44       Imaging  OUTSIDE IMAGES-CT HEAD   Final Result      CT-FOREIGN FILM CAT SCAN   Final Result      [id number] FILM X-RAY   Final Result      DX-HIP-COMPLETE - UNILATERAL 2+ RIGHT    (Results Pending)        Assessment/Plan  * SDH (subdural hematoma) (HCC)- (present on admission)  Assessment & Plan  Traumatic, secondary to head injury from ground-level fall.    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 No midline shift  Discussed with neurosurgery and trauma surgery  Conservative management recommended. Avoid antiplatelets and anticoagulation  Maintain normal sodium and blood pressure  PT OT evaluation for frequent falls    Advance care planning  Assessment & Plan  Full Code: I discussed code status with patient . She has medical decision capacity. She wishes to have all life sustaining efforts if needed. I discussed advance care planning with the patient for 16 minutes, including diagnosis, prognosis, plan of care, risks and benefits of any therapies that could be offered, as well as alternatives including palliation and hospice, as appropriate.        Pain of right hip  Assessment & Plan  R hip pain with limited ROM, tenderness. Fall  PT/OT  R hip xray ordered  Multimodal pain managements including po and iv prn narcotics     Hypokalemia  Assessment & Plan  P.o. replacement ordered    Pancytopenia  Assessment & Plan  Platelets 108  WBC 4.3  Hemoglobin 10.7.  .3  ?  Secondary to alcohol abuse  No sign of bleeding, cont monitoring cbc    Alcohol abuse with dependence  Assessment & Plan  Monitor for withdrawal  Alcohol cessation counseling, patient voiced understanding  On iv and po ativan prn, monitoring ciwa score and respiratory status         VTE prophylaxis: SCDs/TEDs    I have performed a physical exam and reviewed and updated ROS and Plan  today (4/27/2023). In review of yesterday's note (4/26/2023), there are no changes except as documented above.

## 2023-04-27 NOTE — ASSESSMENT & PLAN NOTE
Monitor for withdrawal  Alcohol cessation counseling, patient voiced understanding  On iv and po ativan prn, monitoring ciwa score and respiratory status  Start B1, folate and multivitamin  4/29: Patient has no sign of alcohol withdrawal.  DC CIWA protocol.  Discussed with pharmacy

## 2023-04-27 NOTE — PROGRESS NOTES
"      Mental status adequate for full examination?: Yes    Spine cleared (radiologically and/or clinically): Yes    REVIEW OF SYSTEMS:  Review of Systems   Constitutional:  Positive for malaise/fatigue.   HENT: Negative.     Eyes:  Negative for blurred vision.   Respiratory: Negative.     Gastrointestinal: Negative.    Genitourinary:         Voiding   Musculoskeletal:  Positive for myalgias.   Neurological: Negative.  Negative for dizziness and headaches.   Psychiatric/Behavioral: Negative.     All other systems reviewed and are negative.    PHYSICAL EXAMINATION:  BP 91/52   Pulse 72   Temp 36.9 °C (98.4 °F) (Temporal)   Resp 16   Ht 1.6 m (5' 2.99\")   Wt 43.3 kg (95 lb 7.4 oz)   SpO2 91%   BMI 16.91 kg/m²   Physical Exam  Vitals and nursing note reviewed.   Constitutional:       General: She is not in acute distress.     Appearance: Normal appearance.   HENT:      Head: Atraumatic.      Right Ear: External ear normal.      Left Ear: External ear normal.      Nose: Nose normal.      Mouth/Throat:      Mouth: Mucous membranes are moist.      Pharynx: Oropharynx is clear.   Eyes:      General:         Right eye: No discharge.         Left eye: No discharge.      Extraocular Movements: Extraocular movements intact.      Pupils: Pupils are equal, round, and reactive to light.   Pulmonary:      Effort: Pulmonary effort is normal. No respiratory distress.      Breath sounds: Normal breath sounds.   Abdominal:      General: There is no distension.      Palpations: Abdomen is soft.      Tenderness: There is no abdominal tenderness.   Musculoskeletal:         General: No tenderness.      Cervical back: Normal range of motion. No rigidity. No muscular tenderness.   Skin:     General: Skin is warm and dry.      Capillary Refill: Capillary refill takes less than 2 seconds.   Neurological:      General: No focal deficit present.      Mental Status: She is alert and oriented to person, place, and time.   Psychiatric:       "   Mood and Affect: Mood normal.         Behavior: Behavior normal.         Thought Content: Thought content normal.       LABORATORY VALUES:  Recent Labs     04/26/23  1714 04/27/23  0334   WBC 4.3* 3.6*   RBC 3.19* 3.47*   HEMOGLOBIN 10.7* 11.6*   HEMATOCRIT 32.0* 35.3*   .3* 101.7*   MCH 33.5* 33.4*   MCHC 33.4* 32.9*   RDW 45.2 46.7   PLATELETCT 108* 117*   MPV 9.3 9.2     Recent Labs     04/26/23  1714   SODIUM 139   POTASSIUM 3.3*   CHLORIDE 104   CO2 23   GLUCOSE 85   BUN 11   CREATININE 0.48*   CALCIUM 7.0*     Recent Labs     04/26/23  1714   ASTSGOT 31   ALTSGPT 12   TBILIRUBIN 0.9   ALKPHOSPHAT 60   GLOBULIN 2.3   INR 1.05     Recent Labs     04/26/23  1714   APTT 29.3   INR 1.05       IMAGING:  OUTSIDE IMAGES-CT HEAD   Final Result      CT-FOREIGN FILM CAT SCAN   Final Result      [id number] FILM X-RAY   Final Result          All current laboratory studies/radiology exams reviewed: Yes    Medications reconciliation has been reviewed: Yes    Completed Consultations:  Neurosurgery  Trauma     Pending Consultations:  None    Newly Identified Diagnoses and Injuries:  Non    RAP Score Total: 7    CAGE Results: not completed Blood Alcohol>0.08: no     Nothing further from trauma perspective - signing off  Please reach out to trauma DESHAWN on neuro for any questions or issues.     Discussed patient condition with RN, Patient, and Dr. Marr .

## 2023-04-27 NOTE — ED NOTES
Pt. Was able to eat sandwich provided without difficulty.  Denies other needs at this time.  Pt. Contacted  to update him on her cell phone. Pt. Condition has remained unchanged.

## 2023-04-27 NOTE — ED TRIAGE NOTES
Chief Complaint   Patient presents with    Trauma Green     Pt had a GLF last night. Trauma green transfer for SDH, acute vs chronic. Pt arrives [facility] 15 complaining of head pain and right hip pain.      Pt BIB EMS from [facility]. Pt had an unwitnessed GLF last night. Pt endorses ETOH prior to fall.     /63   Pulse (!) 104   Temp 37.4 °C (99.3 °F) (Temporal)   Resp 15   Ht 1.524 m (5')   Wt 45.4 kg (100 lb)   SpO2 92%   BMI 19.53 kg/m²

## 2023-04-27 NOTE — PROGRESS NOTES
Monitor summary: SR 78-90, CT -0.16, QRS -0.07, QT -0.36, with rare PVC per strip from the monitor room.

## 2023-04-27 NOTE — ED PROVIDER NOTES
ED Provider Note    CHIEF COMPLAINT  Chief Complaint   Patient presents with    Trauma Green     Pt had a GLF last night. Trauma green transfer for SDH, acute vs chronic. Pt arrives [facility] 15 complaining of head pain and right hip pain.        EXTERNAL RECORDS REVIEWED  Reviewed outpatient and transferring records imaging studies laboratory studies    HPI/ROS  LIMITATION TO HISTORY   None  OUTSIDE HISTORIAN(S):  EMS    Lizzie Lema is a 82 y.o. female who presents as a transfer for head trauma.  The patient was transferred from [facility].  She presented there after a ground-level fall last night.  She does admit to drinking some alcohol and she did not do single recall striking her head and primarily had right hip pain.  She had extensive work-up and evaluation at that facility.  She was subsequently diagnosed with an acute right-sided 9 mm subdural hematoma.  She had right hip pain but CT scan of the pelvis was negative for any acute fracture or pathology.  She does not take any blood thinners.  She was transferred here for higher level of care.  She reports mild headache but no seizures.  No focal numbness tingling weakness in arms legs or face    PAST MEDICAL HISTORY   has a past medical history of Glaucoma and Stroke (HCC).    SURGICAL HISTORY   has a past surgical history that includes appendectomy.  No major surgeries  FAMILY HISTORY  No family history on file.  No history of bleeding disorder  SOCIAL HISTORY  Social History     Tobacco Use    Smoking status: Never    Smokeless tobacco: Never    Tobacco comments:     daily   Vaping Use    Vaping Use: Never used   Substance and Sexual Activity    Alcohol use: Yes    Drug use: Never    Sexual activity: Not on file     No drug or alcohol abuse  CURRENT MEDICATIONS  Home Medications    **Home medications have not yet been reviewed for this encounter**     No meds    ALLERGIES  No Known Allergies    PHYSICAL EXAM  VITAL SIGNS: /59    Pulse 95   Temp 37.4 °C (99.3 °F) (Temporal)   Resp 16   Ht 1.524 m (5')   Wt 45.4 kg (100 lb)   SpO2 92%   BMI 19.53 kg/m²    Pulse ox interpretation: I interpret this pulse ox as normal.  Constitutional: Alert and oriented x 3, no acute distress  HEENT: Atraumatic normocephalic, pupils are equal round reactive to light extraocular movements are intact. The nares is clear, external ears are normal, mouth shows moist mucous membranes normal dentition for age  Neck: Supple, no JVD no tracheal deviation no midline bony tenderness  Cardiovascular: Regular rate and rhythm no murmur rub or gallop 2+ pulses peripherally x4  Thorax & Lungs: No respiratory distress, no wheezes rales or rhonchi, No chest tenderness.   GI: Soft nontender nondistended positive bowel sounds, no peritoneal signs  Back: No midline thoracolumbar bony tenderness  Skin: Warm dry no acute rash or lesion  Musculoskeletal: Patient has pain with range of motion the right hip without shortening or deformity.    Neurologic: Cranial nerves III through XII are grossly intact no sensory deficit no cerebellar dysfunction no pronator drift finger-nose testing is normal normal strength and sensation in the upper or lower extremities.  No seizure activity.  GCS 15 NIH stroke scale score of 0  Psychiatric: Appropriate affect for situation at this time          DIAGNOSTIC STUDIES / PROCEDURES      LABS  Results for orders placed or performed during the hospital encounter of 04/26/23   CBC WITH DIFFERENTIAL   Result Value Ref Range    WBC 4.3 (L) 4.8 - 10.8 K/uL    RBC 3.19 (L) 4.20 - 5.40 M/uL    Hemoglobin 10.7 (L) 12.0 - 16.0 g/dL    Hematocrit 32.0 (L) 37.0 - 47.0 %    .3 (H) 81.4 - 97.8 fL    MCH 33.5 (H) 27.0 - 33.0 pg    MCHC 33.4 (L) 33.6 - 35.0 g/dL    RDW 45.2 35.9 - 50.0 fL    Platelet Count 108 (L) 164 - 446 K/uL    MPV 9.3 9.0 - 12.9 fL    Neutrophils-Polys 77.90 (H) 44.00 - 72.00 %    Lymphocytes 12.50 (L) 22.00 - 41.00 %    Monocytes  8.90 0.00 - 13.40 %    Eosinophils 0.00 0.00 - 6.90 %    Basophils 0.20 0.00 - 1.80 %    Immature Granulocytes 0.50 0.00 - 0.90 %    Nucleated RBC 0.00 /100 WBC    Neutrophils (Absolute) 3.31 2.00 - 7.15 K/uL    Lymphs (Absolute) 0.53 (L) 1.00 - 4.80 K/uL    Monos (Absolute) 0.38 0.00 - 0.85 K/uL    Eos (Absolute) 0.00 0.00 - 0.51 K/uL    Baso (Absolute) 0.01 0.00 - 0.12 K/uL    Immature Granulocytes (abs) 0.02 0.00 - 0.11 K/uL    NRBC (Absolute) 0.00 K/uL   Comp Metabolic Panel   Result Value Ref Range    Sodium 139 135 - 145 mmol/L    Potassium 3.3 (L) 3.6 - 5.5 mmol/L    Chloride 104 96 - 112 mmol/L    Co2 23 20 - 33 mmol/L    Anion Gap 12.0 7.0 - 16.0    Glucose 85 65 - 99 mg/dL    Bun 11 8 - 22 mg/dL    Creatinine 0.48 (L) 0.50 - 1.40 mg/dL    Calcium 7.0 (L) 8.5 - 10.5 mg/dL    AST(SGOT) 31 12 - 45 U/L    ALT(SGPT) 12 2 - 50 U/L    Alkaline Phosphatase 60 30 - 99 U/L    Total Bilirubin 0.9 0.1 - 1.5 mg/dL    Albumin 2.8 (L) 3.2 - 4.9 g/dL    Total Protein 5.1 (L) 6.0 - 8.2 g/dL    Globulin 2.3 1.9 - 3.5 g/dL    A-G Ratio 1.2 g/dL   Prothrombin Time   Result Value Ref Range    PT 13.6 12.0 - 14.6 sec    INR 1.05 0.87 - 1.13   APTT   Result Value Ref Range    APTT 29.3 24.7 - 36.0 sec   DIAGNOSTIC ALCOHOL   Result Value Ref Range    Diagnostic Alcohol <10.1 <10.1 mg/dL   CORRECTED CALCIUM   Result Value Ref Range    Correct Calcium 8.0 (L) 8.5 - 10.5 mg/dL   ESTIMATED GFR   Result Value Ref Range    GFR (CKD-EPI) 93 >60 mL/min/1.73 m 2     Laboratory studies from outside facility were reviewed.  CBC and metabolic panel are unremarkable other than slightly low platelets    RADIOLOGY  I have independently interpreted the diagnostic imaging associated with this visit and am waiting the final reading from the radiologist.   My preliminary interpretation is as follows:   Radiologist interpretation:   CT scan of the head demonstrates a 9 mm frontal subdural fluid collection with mixed density with small foci of acute  hemorrhage.  No midline shift.  CT scan of the pelvis is negative for acute fracture or dislocation.  COURSE & MEDICAL DECISION MAKING    ED Observation Status? No; Patient does not meet criteria for ED Observation.     INITIAL ASSESSMENT, COURSE AND PLAN  Care Narrative:  This is a very pleasant 82-year-old female who was transferred here from [facility].  She had a ground-level fall last night and was subsequently diagnosed with a 9 mm right-sided frontal subdural hematoma.  There is no midline shift and she has a nonfocal neurological exam.  She is a big 3 criteria.  Consultation with Dr. Maynard with no surgery was obtained.  He feels the patient will require admission but she can go to the floor but does not require emergent surgical intervention at this time.  Trauma surgery was aware of the patient as well.  The patient will be admitted to the hospitalist service .  Patient may very well require MRI of the right hip as she has ongoing pain with a normal CT scan.  ADDITIONAL PROBLEM LIST    DISPOSITION AND DISCUSSIONS  I have discussed management of the patient with the following physicians and DESHAWN's: Discussed with Dr. Coffey with neurosurgery as well as admitting hospitalist    Discussion of management with other [facility] or appropriate source(s): None    Escalation of care considered, and ultimately not performed: None    Barriers to care at this time, including but not limited to: None    Decision tools and prescription drugs considered including, but not limited to: None    FINAL DIAGNOSIS  Acute 9 mm right-sided subdural hemorrhage  Right hip contusion     CRITICAL CARE TIME:    The patient required approximately 35 minutes worth of critical care time. This excludes any procedures. This includes time spent directly at caring for the patient, making critical medical decisions, involving consultants and speaking with the family.  Electronically signed by: Hakeem Hull M.D., 4/26/2023  5:31 PM

## 2023-04-27 NOTE — ED NOTES
Bedside report to LIZETT Mcintyre.  Pt. Denies needs at this time.  All safety measures maintained.

## 2023-04-27 NOTE — ED NOTES
Med Rec complete per patient  No oral antibiotics in the last 30 days   No known allergies  Preferred Pharmacy: [facility] [address]

## 2023-04-27 NOTE — PROGRESS NOTES
4 Eyes Skin Assessment Completed by LIZETT Sim and LIZETT Barragan.    Head WDL  Ears WDL  Nose WDL  Mouth WDL  Neck WDL  Breast/Chest WDL  Shoulder Blades WDL  Spine WDL  (R) Arm/Elbow/Hand WDL  (L) Arm/Elbow/Hand WDL  Abdomen WDL  Groin WDL  Scrotum/Coccyx/Buttocks Blanching, Redness  (R) Leg Bruising  (L) Leg Bruising  (R) Heel/Foot/Toe WDL  (L) Heel/Foot/Toe WDL          Devices In Places Tele Box      Interventions In Place Pillows    Possible Skin Injury No    Pictures Uploaded Into Epic N/A  Wound Consult Placed N/A  RN Wound Prevention Protocol Ordered No

## 2023-04-27 NOTE — ASSESSMENT & PLAN NOTE
Full Code: The CODE STATUS was discussed with the patient. She has medical decision capacity. She wishes to have all life sustaining efforts if needed. I discussed advance care planning with the patient for 16 minutes, including diagnosis, prognosis, plan of care, risks and benefits of any therapies that could be offered, as well as alternatives including palliation and hospice, as appropriate.

## 2023-04-27 NOTE — ASSESSMENT & PLAN NOTE
Platelets 108  WBC 4.3  Hemoglobin 10.7.  .3  ?  Secondary to alcohol abuse  No sign of bleeding, cont monitoring cbc

## 2023-04-27 NOTE — CONSULTS
Physical Medicine and Rehabilitation Consultation              Date of initial consultation: 4/27/2023  Requesting provider: ordered by Garo Castano M.D. at 04/27/23 1631  Consulting provider: Tona Wallace D.O.  Reason for consultation: assess for acute inpatient rehab appropriateness  LOS: 1 Day(s)    Chief complaint: SDH after GLF     HPI: The patient is a 82 y.o.  female with a past medical history of  CVA 10 years ago, alcohol abuse, and non compliance with medications;  who presented on 4/26/2023  5:02 PM as a transfer from [facility] with a SDH after a ground level fall. Per documentation, patient had a half a bottle of whiskey and then fell getting up off the toilet. She fell on her right hip and hit her head. Upon eval in the ED at [facility], CT head showed a 9mm SDH and patient was transferred to [facility] for higher level of care. Neurosurgery consulted, non op management recommended. Patient has [facility] protocol in place to monitor for alcohol w/d.  Patient has been limited by her R hip pain, R hip xray obtained was negative for acute fracture .  Patient's hospital course has been complicated by pancytopenia, WBC 4.3 and plts 108.     Patient seen and examined at bedside. Reports she feels ok, was just sleeping. Reports feeling fatigued. Reports her R hip pain is bothersome. Reviewed with patient negative hip xray, and option to trial lidocaine patch, patient agreeable.   Does not report HA, lightheadedness, SOB, CP, abdominal pain, or changes in numbness/tingling/weakness. Had urinary incont episode, reports she didn't know if she could get up to go to the toilet.       Social Hx:  Patient lives with  boyfriend in a 1 story house with 1 [address]RI[address]. Boyfriend can help   1 CHRIST[address]NA  At prior level of function patient was Independent with mobility and ADLs.       Tobacco: Denies   Alcohol: regular daily alcohol use   Drugs: Denies     THERAPY:  Restrictions: Fall Risk   PT: Functional mobility   4/27 Max A bed  mobility, Min A sit to stand, unable to perform gait due to hip pain     OT: ADLs  4/27 Mod A bed mobility, Max A lower body dressing, supervision upper body dressing     SLP:   4/27 regular and thins     IMAGING:  R hip xray 4/27   IMPRESSION:     No acute osseous abnormality    PROCEDURES:  None     PMH:  Past Medical History:   Diagnosis Date    Glaucoma     Stroke (HCC)        PSH:  Past Surgical History:   Procedure Laterality Date    APPENDECTOMY         FHX:  No family history on file.    Medications:  Current Facility-Administered Medications   Medication Dose    senna-docusate (PERICOLACE or SENOKOT S) 8.6-50 MG per tablet 2 Tablet  2 Tablet    And    polyethylene glycol/lytes (MIRALAX) PACKET 1 Packet  1 Packet    And    magnesium hydroxide (MILK OF MAGNESIA) suspension 30 mL  30 mL    And    bisacodyl (DULCOLAX) suppository 10 mg  10 mg    acetaminophen (Tylenol) tablet 650 mg  650 mg    Pharmacy Consult Request ...Pain Management Review 1 Each  1 Each    oxyCODONE immediate-release (ROXICODONE) tablet 5 mg  5 mg    Or    oxyCODONE immediate release (ROXICODONE) tablet 10 mg  10 mg    Or    morphine 4 MG/ML injection 4 mg  4 mg    ondansetron (ZOFRAN) syringe/vial injection 4 mg  4 mg    ondansetron (ZOFRAN ODT) dispertab 4 mg  4 mg    Respiratory Therapy Consult      LORazepam (ATIVAN) injection 2 mg  2 mg    acetaminophen (Tylenol) tablet 650 mg  650 mg    Or    acetaminophen (TYLENOL) suppository 650 mg  650 mg    labetalol (NORMODYNE/TRANDATE) injection 10 mg  10 mg    Or    labetalol (NORMODYNE) tablet 200 mg  200 mg    LORazepam (ATIVAN) tablet 0.5 mg  0.5 mg    LORazepam (ATIVAN) tablet 1 mg  1 mg    Or    LORazepam (ATIVAN) injection 0.5 mg  0.5 mg    LORazepam (ATIVAN) tablet 2 mg  2 mg    Or    LORazepam (ATIVAN) injection 1 mg  1 mg    LORazepam (ATIVAN) tablet 3 mg  3 mg    Or    LORazepam (ATIVAN) injection 1.5 mg  1.5 mg    LORazepam (ATIVAN) tablet 4 mg  4 mg    Or    LORazepam (ATIVAN)  "injection 2 mg  2 mg    thiamine (Vitamin B-1) tablet 100 mg  100 mg    And    multivitamin tablet 1 Tablet  1 Tablet    And    folic acid (FOLVITE) tablet 1 mg  1 mg    brimonidine (ALPHAGAN) 0.2 % ophthalmic solution 1 Drop  1 Drop       Allergies:  No Known Allergies    Physical Exam:  Vitals: /61   Pulse 72   Temp 36.5 °C (97.7 °F) (Temporal)   Resp 17   Ht 1.6 m (5' 2.99\")   Wt 43.3 kg (95 lb 7.4 oz)   SpO2 90%   Gen: NAD, laying comfortably in bed   Head:  NC/AT  Eyes/ Nose/ Mouth: PERRLA, moist mucous membranes  Cardio: RRR, good distal perfusion, warm extremities  Pulm: normal respiratory effort, no cyanosis   Abd: Soft NTND, negative borborygmi   Ext: No peripheral edema. No calf tenderness. No clubbing. R hip NTTP but pain with ROM     Mental status:  A&Ox4 (person, place, date, situation) answers questions appropriately follows commands states the year is 1923   Speech: fluent, no aphasia or dysarthria    CRANIAL NERVES:  2,3: visual acuity grossly intact, PERRL  3,4,6: EOMI bilaterally, no nystagmus or diplopia  5: sensation intact to light touch bilaterally and symmetric  7: no facial asymmetry  8: hearing grossly intact    Motor:      Upper Extremity  Myotome R L   Shoulder flexion C5 5/5 5/5   Elbow flexion C5 5/5 5/5   Wrist extension C6 5/5 5/5   Elbow extension C7 5/5 5/5   Finger flexion C8 5/5 5/5   Finger abduction T1 5/5 5/5     Lower Extremity Myotome R L   Hip flexion L2 3. Limited by pain /5 5/5   Knee extension L3 4/5 5/5   Ankle dorsiflexion L4 5/5 5/5   Toe extension L5 5/5 5/5   Ankle plantarflexion S1 5/5 5/5       Negative Pronator drift bilaterally    Sensory:   intact to light touch through out    DTRs: 2+ in bilateral  biceps  No clonus at bilateral ankles  Negative Kate b/l     Tone: no spasticity noted, no cogwheeling noted    Coordination:   intact finger to nose bilaterally  intact fine motor with fingers bilaterally      Labs: Reviewed and significant for "   Recent Labs     04/26/23  1714 04/27/23  0334   RBC 3.19* 3.47*   HEMOGLOBIN 10.7* 11.6*   HEMATOCRIT 32.0* 35.3*   PLATELETCT 108* 117*   PROTHROMBTM 13.6  --    APTT 29.3  --    INR 1.05  --      Recent Labs     04/26/23  1714   SODIUM 139   POTASSIUM 3.3*   CHLORIDE 104   CO2 23   GLUCOSE 85   BUN 11   CREATININE 0.48*   CALCIUM 7.0*     Recent Results (from the past 24 hour(s))   PLATELET MAPPING WITH BASIC TEG    Collection Time: 04/26/23  5:11 PM   Result Value Ref Range    Reaction Time Initial-R 5.1 4.6 - 9.1 min    React Time Initial Hep 5.3 4.3 - 8.3 min    Clot Kinetics-K 1.9 0.8 - 2.1 min    Clot Angle-Angle 68.8 63.0 - 78.0 degrees    Maximum Clot Strength-MA 49.4 (L) 52.0 - 69.0 mm    TEG Functional Fibrinogen(MA) 14.7 (L) 15.0 - 32.0 mm    % Inhibition ADP 55.6 (H) 0.0 - 17.0 %    % Inhibition AA 45.8 (H) 0.0 - 11.0 %    TEG Algorithm Link Algorithm    COD - Adult (Type and Screen)    Collection Time: 04/26/23  5:11 PM   Result Value Ref Range    ABO Grouping Only O     Rh Grouping Only POS     Antibody Screen-Cod NEG    CBC WITH DIFFERENTIAL    Collection Time: 04/26/23  5:14 PM   Result Value Ref Range    WBC 4.3 (L) 4.8 - 10.8 K/uL    RBC 3.19 (L) 4.20 - 5.40 M/uL    Hemoglobin 10.7 (L) 12.0 - 16.0 g/dL    Hematocrit 32.0 (L) 37.0 - 47.0 %    .3 (H) 81.4 - 97.8 fL    MCH 33.5 (H) 27.0 - 33.0 pg    MCHC 33.4 (L) 33.6 - 35.0 g/dL    RDW 45.2 35.9 - 50.0 fL    Platelet Count 108 (L) 164 - 446 K/uL    MPV 9.3 9.0 - 12.9 fL    Neutrophils-Polys 77.90 (H) 44.00 - 72.00 %    Lymphocytes 12.50 (L) 22.00 - 41.00 %    Monocytes 8.90 0.00 - 13.40 %    Eosinophils 0.00 0.00 - 6.90 %    Basophils 0.20 0.00 - 1.80 %    Immature Granulocytes 0.50 0.00 - 0.90 %    Nucleated RBC 0.00 /100 WBC    Neutrophils (Absolute) 3.31 2.00 - 7.15 K/uL    Lymphs (Absolute) 0.53 (L) 1.00 - 4.80 K/uL    Monos (Absolute) 0.38 0.00 - 0.85 K/uL    Eos (Absolute) 0.00 0.00 - 0.51 K/uL    Baso (Absolute) 0.01 0.00 - 0.12 K/uL     Immature Granulocytes (abs) 0.02 0.00 - 0.11 K/uL    NRBC (Absolute) 0.00 K/uL   Comp Metabolic Panel    Collection Time: 04/26/23  5:14 PM   Result Value Ref Range    Sodium 139 135 - 145 mmol/L    Potassium 3.3 (L) 3.6 - 5.5 mmol/L    Chloride 104 96 - 112 mmol/L    Co2 23 20 - 33 mmol/L    Anion Gap 12.0 7.0 - 16.0    Glucose 85 65 - 99 mg/dL    Bun 11 8 - 22 mg/dL    Creatinine 0.48 (L) 0.50 - 1.40 mg/dL    Calcium 7.0 (L) 8.5 - 10.5 mg/dL    AST(SGOT) 31 12 - 45 U/L    ALT(SGPT) 12 2 - 50 U/L    Alkaline Phosphatase 60 30 - 99 U/L    Total Bilirubin 0.9 0.1 - 1.5 mg/dL    Albumin 2.8 (L) 3.2 - 4.9 g/dL    Total Protein 5.1 (L) 6.0 - 8.2 g/dL    Globulin 2.3 1.9 - 3.5 g/dL    A-G Ratio 1.2 g/dL   Prothrombin Time    Collection Time: 04/26/23  5:14 PM   Result Value Ref Range    PT 13.6 12.0 - 14.6 sec    INR 1.05 0.87 - 1.13   APTT    Collection Time: 04/26/23  5:14 PM   Result Value Ref Range    APTT 29.3 24.7 - 36.0 sec   DIAGNOSTIC ALCOHOL    Collection Time: 04/26/23  5:14 PM   Result Value Ref Range    Diagnostic Alcohol <10.1 <10.1 mg/dL   CORRECTED CALCIUM    Collection Time: 04/26/23  5:14 PM   Result Value Ref Range    Correct Calcium 8.0 (L) 8.5 - 10.5 mg/dL   ESTIMATED GFR    Collection Time: 04/26/23  5:14 PM   Result Value Ref Range    GFR (CKD-EPI) 93 >60 mL/min/1.73 m 2   Lipid Profile - Fasting    Collection Time: 04/26/23  5:14 PM   Result Value Ref Range    Cholesterol,Tot 119 100 - 199 mg/dL    Triglycerides 46 0 - 149 mg/dL    HDL 66 >=40 mg/dL    LDL 44 <100 mg/dL   CBC with Differential    Collection Time: 04/27/23  3:34 AM   Result Value Ref Range    WBC 3.6 (L) 4.8 - 10.8 K/uL    RBC 3.47 (L) 4.20 - 5.40 M/uL    Hemoglobin 11.6 (L) 12.0 - 16.0 g/dL    Hematocrit 35.3 (L) 37.0 - 47.0 %    .7 (H) 81.4 - 97.8 fL    MCH 33.4 (H) 27.0 - 33.0 pg    MCHC 32.9 (L) 33.6 - 35.0 g/dL    RDW 46.7 35.9 - 50.0 fL    Platelet Count 117 (L) 164 - 446 K/uL    MPV 9.2 9.0 - 12.9 fL     Neutrophils-Polys 64.10 44.00 - 72.00 %    Lymphocytes 22.30 22.00 - 41.00 %    Monocytes 11.30 0.00 - 13.40 %    Eosinophils 0.30 0.00 - 6.90 %    Basophils 0.30 0.00 - 1.80 %    Immature Granulocytes 1.70 (H) 0.00 - 0.90 %    Nucleated RBC 0.00 /100 WBC    Neutrophils (Absolute) 2.33 2.00 - 7.15 K/uL    Lymphs (Absolute) 0.81 (L) 1.00 - 4.80 K/uL    Monos (Absolute) 0.41 0.00 - 0.85 K/uL    Eos (Absolute) 0.01 0.00 - 0.51 K/uL    Baso (Absolute) 0.01 0.00 - 0.12 K/uL    Immature Granulocytes (abs) 0.06 0.00 - 0.11 K/uL    NRBC (Absolute) 0.00 K/uL   Magnesium    Collection Time: 04/27/23  3:34 AM   Result Value Ref Range    Magnesium 1.7 1.5 - 2.5 mg/dL   Phosphorus    Collection Time: 04/27/23  3:34 AM   Result Value Ref Range    Phosphorus 3.2 2.5 - 4.5 mg/dL         ASSESSMENT:  Patient is a 82 y.o. female admitted with SDH     C Code / Diagnosis to Support: 0002.22 - Brain Dysfunction: Traumatic, Closed Injury    Rehabilitation: Impaired ADLs and mobility  Patient is a fair candidate for inpatient rehab based on needs for PT, OT, and speech therapy, however will need to confirm DC support   Barriers to transfer include: Insurance authorization, TCCs to verify disposition, medical clearance and bed availability     Additional Recommendations:  TBI  SDH   - sustained during fall from toilet while intoxicated   - CT head at [facility] showed 9mm SDH   - neurosurgery consulted   - recommend non op management and repeat CT head   - not currently on keppra, based on SDH size and category should be on keppra per trauma, notified Hospitalist   -avoid AC   - continue with PT/OT/SLP   -  limited by R hip pain     Alcohol dependence   - regular daily alcohol use   - CIWA protocol   - thiamine supplements     R hip pain  - sustained during fall   - xrays negative for acute fracture   - pain control    -lidocaine patch ordered      Dispo:  - patient is currently functioning below their level of baseline, recommend post acute  rehab  - recommend IRF level therapy with 3hr of therapy 5 days per week   - piror to acceptance to IRF, will need insurance auth and confirmation of DC support   - TCC to assist with insurance auth and DC support         Medical Complexity:   TBI  SDH   Alcohol dependence   R hip pain   Pancytopenia   Impaired mobility and ADLs       DVT PPX: SCDs       Thank you for allowing us to participate in the care of this patient.     Patient was seen for 84  minutes on unit/floor of which > 50% of time was spent on counseling and coordination of care regarding the above, including prognosis, risk reduction, benefits of treatment, and options for next stage of care.    Tona Wallace D.O.   Physical Medicine and Rehabilitation     Please note that this dictation was created using voice recognition software. I have made every reasonable attempt to correct obvious errors, but there may be errors of grammar and possibly content that I did not discover before finalizing the note.

## 2023-04-28 ENCOUNTER — HOSPITAL ENCOUNTER (INPATIENT)
Facility: REHABILITATION | Age: 83
End: 2023-04-28
Attending: PHYSICAL MEDICINE & REHABILITATION | Admitting: PHYSICAL MEDICINE & REHABILITATION
Payer: COMMERCIAL

## 2023-04-28 LAB
ANION GAP SERPL CALC-SCNC: 8 MMOL/L (ref 7–16)
BUN SERPL-MCNC: 17 MG/DL (ref 8–22)
CALCIUM SERPL-MCNC: 8.2 MG/DL (ref 8.5–10.5)
CHLORIDE SERPL-SCNC: 100 MMOL/L (ref 96–112)
CO2 SERPL-SCNC: 25 MMOL/L (ref 20–33)
CREAT SERPL-MCNC: 0.59 MG/DL (ref 0.5–1.4)
ERYTHROCYTE [DISTWIDTH] IN BLOOD BY AUTOMATED COUNT: 45.4 FL (ref 35.9–50)
GFR SERPLBLD CREATININE-BSD FMLA CKD-EPI: 88 ML/MIN/1.73 M 2
GLUCOSE SERPL-MCNC: 135 MG/DL (ref 65–99)
HCT VFR BLD AUTO: 34.2 % (ref 37–47)
HGB BLD-MCNC: 11.5 G/DL (ref 12–16)
MAGNESIUM SERPL-MCNC: 1.7 MG/DL (ref 1.5–2.5)
MCH RBC QN AUTO: 33.4 PG (ref 27–33)
MCHC RBC AUTO-ENTMCNC: 33.6 G/DL (ref 33.6–35)
MCV RBC AUTO: 99.4 FL (ref 81.4–97.8)
PLATELET # BLD AUTO: 109 K/UL (ref 164–446)
PMV BLD AUTO: 9.6 FL (ref 9–12.9)
POTASSIUM SERPL-SCNC: 3.8 MMOL/L (ref 3.6–5.5)
RBC # BLD AUTO: 3.44 M/UL (ref 4.2–5.4)
SODIUM SERPL-SCNC: 133 MMOL/L (ref 135–145)
WBC # BLD AUTO: 3.9 K/UL (ref 4.8–10.8)

## 2023-04-28 PROCEDURE — 700102 HCHG RX REV CODE 250 W/ 637 OVERRIDE(OP): Performed by: INTERNAL MEDICINE

## 2023-04-28 PROCEDURE — 700101 HCHG RX REV CODE 250: Performed by: PHYSICAL MEDICINE & REHABILITATION

## 2023-04-28 PROCEDURE — 80048 BASIC METABOLIC PNL TOTAL CA: CPT

## 2023-04-28 PROCEDURE — 36415 COLL VENOUS BLD VENIPUNCTURE: CPT

## 2023-04-28 PROCEDURE — 770020 HCHG ROOM/CARE - TELE (206)

## 2023-04-28 PROCEDURE — 85027 COMPLETE CBC AUTOMATED: CPT

## 2023-04-28 PROCEDURE — 99233 SBSQ HOSP IP/OBS HIGH 50: CPT | Performed by: STUDENT IN AN ORGANIZED HEALTH CARE EDUCATION/TRAINING PROGRAM

## 2023-04-28 PROCEDURE — 92523 SPEECH SOUND LANG COMPREHEN: CPT

## 2023-04-28 PROCEDURE — A9270 NON-COVERED ITEM OR SERVICE: HCPCS | Performed by: STUDENT IN AN ORGANIZED HEALTH CARE EDUCATION/TRAINING PROGRAM

## 2023-04-28 PROCEDURE — 700102 HCHG RX REV CODE 250 W/ 637 OVERRIDE(OP): Performed by: STUDENT IN AN ORGANIZED HEALTH CARE EDUCATION/TRAINING PROGRAM

## 2023-04-28 PROCEDURE — 83735 ASSAY OF MAGNESIUM: CPT

## 2023-04-28 PROCEDURE — A9270 NON-COVERED ITEM OR SERVICE: HCPCS | Performed by: INTERNAL MEDICINE

## 2023-04-28 RX ORDER — CYCLOBENZAPRINE HCL 10 MG
5 TABLET ORAL 3 TIMES DAILY PRN
Status: DISCONTINUED | OUTPATIENT
Start: 2023-04-28 | End: 2023-05-03 | Stop reason: HOSPADM

## 2023-04-28 RX ADMIN — ACETAMINOPHEN 650 MG: 325 TABLET, FILM COATED ORAL at 12:26

## 2023-04-28 RX ADMIN — DOCUSATE SODIUM 50 MG AND SENNOSIDES 8.6 MG 2 TABLET: 8.6; 5 TABLET, FILM COATED ORAL at 05:01

## 2023-04-28 RX ADMIN — BRIMONIDINE TARTRATE 1 DROP: 2 SOLUTION OPHTHALMIC at 05:01

## 2023-04-28 RX ADMIN — THERA TABS 1 TABLET: TAB at 05:01

## 2023-04-28 RX ADMIN — Medication 100 MG: at 05:01

## 2023-04-28 RX ADMIN — DOCUSATE SODIUM 50 MG AND SENNOSIDES 8.6 MG 2 TABLET: 8.6; 5 TABLET, FILM COATED ORAL at 17:54

## 2023-04-28 RX ADMIN — CYCLOBENZAPRINE 5 MG: 10 TABLET, FILM COATED ORAL at 17:54

## 2023-04-28 RX ADMIN — FOLIC ACID 1 MG: 1 TABLET ORAL at 05:01

## 2023-04-28 RX ADMIN — OXYCODONE 5 MG: 5 TABLET ORAL at 12:24

## 2023-04-28 RX ADMIN — LIDOCAINE 1 PATCH: 50 PATCH TOPICAL at 17:53

## 2023-04-28 ASSESSMENT — LIFESTYLE VARIABLES
TOTAL SCORE: 1
PAROXYSMAL SWEATS: NO SWEAT VISIBLE
TREMOR: NO TREMOR
ORIENTATION AND CLOUDING OF SENSORIUM: ORIENTED AND CAN DO SERIAL ADDITIONS
AUDITORY DISTURBANCES: NOT PRESENT
AGITATION: NORMAL ACTIVITY
VISUAL DISTURBANCES: NOT PRESENT
HEADACHE, FULLNESS IN HEAD: NOT PRESENT
ANXIETY: MILDLY ANXIOUS
NAUSEA AND VOMITING: NO NAUSEA AND NO VOMITING

## 2023-04-28 ASSESSMENT — PAIN DESCRIPTION - PAIN TYPE
TYPE: ACUTE PAIN
TYPE: ACUTE PAIN

## 2023-04-28 ASSESSMENT — ENCOUNTER SYMPTOMS
FALLS: 1
WEAKNESS: 1

## 2023-04-28 NOTE — DISCHARGE PLANNING
Received Choice Form @: 1118  Agency/ Facility Name: [facility]  Referral Sent per Choice Form @: 1136       Received Choice Form @: 1118  Agency/ Facility Name: [facility]  Referral Sent per Choice Form @: 1225      Choice forms are uploaded together

## 2023-04-28 NOTE — DISCHARGE PLANNING
Msg placed to STUART Flores to discuss [facility] & D/C resources/support.  PAR to look into benefits.     0829-Spoke with JET Flores.  They live in a 1LV home with 1ST to enter.  He does work PT and they do not have any other help.  He will look into the possibility of taking some time off and will call me back.     1035-Per PAR: rb 4/28 [facility] Medicare Advantage -[phone] HMO plan- no OON bene. TCC will no longer follow.  Please reach out to myself with any interval changes/questions.   Msg placed to Amy MANE

## 2023-04-28 NOTE — THERAPY
"Speech Language Pathology   Cognitive Evaluation     Patient Name: Lizzie Lema  AGE:  82 y.o., SEX:  female  Medical Record #: [id number]  Date of Service: 4/28/2023      History of Present Illness  82 y.o. woman admitted on 4/26/23 with fall, found to have 9 mm R SDH (non-operative management). PMH notable for alcohol use.     CT Head from [facility].       General Information  Vitals  O2 Delivery Device: None - Room Air  Alert and oriented to self, location, and month.       Prior Living Situation & Level of Function  Comments: Patient lives with SO. He assists with all IADLs.       Oral Mechanism Evaluation  Dentition: Missing posterior dentition  Facial Symmetry: Equal  Facial Sensation: Equal  Labial Observations: WFL  Lingual Observations: Midline  Motor Speech: WFL      Laryngeal Function Exam  Secretion Management: Adequate  Voice Quality: WFL (Presbyphonic)      Subjective  Patient reports she does not manage IADLs because she's \"getting older\" and she \"doesn't see very well.\" Phone call with patient's SOMark. He endorses patient report. He also reports patient being at cognitive baseline (\"She's just weak.\").      Assessment  The patient was seen this date for a cognitive evaluation. Portions of the COGNISTAT (Neurobehavioral Cognitive Status Assessment) were administered in conjunction with non-standardized assessments. Results are outlined below:         Orientation: Mild  Attention: Moderate  Comprehension: Mild  Repetition: Average  Naming: Moderate  Memory: Severe  Calculations: Mild  Similarities: Moderate  Judgement: Average      Comments: Immediate recall of four word memory task achieved on second attempt. With delayed recall of ~7 minutes, patient recalled 1/4 words with category prompt; 1/4 words with three word list; 2/4 words not recalled. Slow processing speed throughout. Breakdown in more complex direction following. Patient frequently stating \"I don't know what that is\" to pictures in " naming task (question r/t English as L2).          Clinical Impressions  Patient presents with severe cognitive impairment in memory; moderate cognitive impairment in attention, naming, and similarities; and mild cognitive impairment in orientation, comprehension, and calculation. An area of relative strength includes judgement. Per patient/SO, she receives assistance with all IADLs at baseline. SO reports patient is at cognitive baseline, thus acute intervention is not warranted. Patient may benefit from home health cognitive services for strategy training (compensatory rather than rehabilitative intervention).       NOTE: It is not within the scope of practice of Speech-Language Pathologists to determine patient capacity. Please defer to the physician or psych to complete this assessment.       Recommendations  Supervision Needs Upons Discharge: Direct assistance with IADLs (see below)  IADLs: Medication management, Financial management, Cooking         SLP Treatment Plan  Treatment Plan: Dysphagia Treatment  SLP Frequency: 3x Per Week  Estimated Duration: Until Therapy Goals Met      Anticipated Discharge Needs  Discharge Recommendations: Recommend home health for continued speech therapy services  Therapy Recommendations Upon DC: Cognitive-Linguistic Training;      Patient / Family Goals  Patient / Family Goal #1: Water  Short Term Goal # 1: Pt will consume regular diet with thins, with no overt s/sx of aspiration.      Windy Jose, SLP

## 2023-04-28 NOTE — DISCHARGE PLANNING
Case Management Discharge Planning    Admission Date: 4/26/2023  GMLOS: 3.3  ALOS: 2    6-Clicks ADL Score: 17  6-Clicks Mobility Score: 8  PT and/or OT Eval ordered: Yes  Post-acute Referrals Ordered: Yes  Post-acute Choice Obtained: Yes  Has referral(s) been sent to post-acute provider:  Yes      Anticipated Discharge Dispo: Discharge Disposition: D/T to SNF with Medicare cert in anticipation of skilled care (03)    DME Needed: No    Action(s) Taken: Per Dr. Kumari, patient medically cleared for DC.  [facility] following; pending verification of DC support & insurance auth.    CM met at bedside with patient to complete assessment & update her on DCP.  Patient, AAOx3 (self, place, situation; not oriented to time) at time of interview, reported residing with boyfriendMark #[phone], in single level house at following address:  [address] [place] [address].  Prior to admission patient was independent with ADLs and ambulating with a cane.  Only DME owned is a FWW which patient does not currently use.   Support system consists of Significant Other, patient's niece, and grandniece.  Patient is retired, collecting [employer] benefits.    Current history of ETOH abuse discussed with patient who stated that once in a while she gets drunk and thinks that it is not a problem and that she should be allowed to enjoy certain things.   Patient declined list of Community Resources for alcohol cessation programs.    No PCP on file.  Patient stated that she cannot remember name of PCP and has not seen him in a while.  CM encouraged patient to re-establish herself with current PCP; patient verbalized understanding.    IRF/RR referral status update provided to patient who agreed to blanket [facility] referrals in case [facility] cannot accept her.      Escalations Completed: Home address updated in Epic, SNF referrals to be submitted by Nissa RUSS.    Medically Clear: No    Next Steps: CM will f/u on IRF & SNF  referrals    Barriers to Discharge: Pending Placement, Pending insurance auth.    Is the patient up for discharge tomorrow:  Medically cleared    **1118 Hrs - SNF & IRF choice forms faxed to [facility].   Received message from ASHLEE Sargent @ [facility], stating that patient doesn't have benefits for [facility].    CM updated patient on DCP.                  Care Transition Team Assessment    Information Source  Orientation Level: Oriented to person, Oriented to place, Oriented to situation, Disoriented to time  Information Given By: Patient  Who is responsible for making decisions for patient? : Patient    Readmission Evaluation  Is this a readmission?: No    Elopement Risk  Legal Hold: No  Ambulatory or Self Mobile in Wheelchair: No-Not an Elopement Risk  Elopement Risk: Not at Risk for Elopement    Interdisciplinary Discharge Planning  Lives with - Patient's Self Care Capacity: Other (Comments) (boyfriend)  Patient or legal guardian wants to designate a caregiver: No  Support Systems: Friends / Neighbors  Housing / Facility: [address]  Prior Services: Home-Independent  Durable Medical Equipment: Not Applicable    Discharge Preparedness  What is your plan after discharge?: Other (comment) (IRF vs SNF)  What are your discharge supports?: Other (comment) (Significant Other-Mark, niece, grandniece)  Prior Functional Level: Ambulatory, Independent with Activities of Daily Living    Functional Assesment  Prior Functional Level: Ambulatory, Independent with Activities of Daily Living    Finances  Financial Barriers to Discharge: No  Prescription Coverage: Yes     Advance Directive  Advance Directive?: None  Advance Directive offered?: AD Booklet given    Domestic Abuse  Have you ever been the victim of abuse or violence?: No  Physical Abuse or Sexual Abuse: No  Verbal Abuse or Emotional Abuse: No  Possible Abuse/Neglect Reported to:: Not Applicable    Psychological Assessment  History of Substance Abuse: Alcohol  Date Last Used -  Alcohol: 4/25/2023  History of Psychiatric Problems: No  Non-compliant with Treatment: Yes  Newly Diagnosed Illness: Yes    Discharge Risks or Barriers  Discharge risks or barriers?: Complex medical needs, Substance abuse  Patient risk factors: Complex medical needs, Noncompliance, Substance abuse    Anticipated Discharge Information  Discharge Disposition: D/T to SNF with Medicare cert in anticipation of skilled care (03)  Discharge Address: [facility]

## 2023-04-28 NOTE — CARE PLAN
The patient is Stable - Low risk of patient condition declining or worsening    Shift Goals  Clinical Goals: pain mangement, monitor neuro status  Patient Goals: rest  Family Goals: manuel    Progress made toward(s) clinical / shift goals:      Problem: Psychosocial  Goal: Patient's level of anxiety will decrease  Outcome: Progressing     Problem: Pain - Standard  Goal: Alleviation of pain or a reduction in pain to the patient’s comfort goal  Outcome: Progressing     Problem: Fall Risk  Goal: Patient will remain free from falls  Outcome: Progressing     Problem: Skin Integrity  Goal: Skin integrity is maintained or improved  Outcome: Progressing          Patient is not progressing towards the following goals:

## 2023-04-28 NOTE — CARE PLAN
The patient is Watcher - Medium risk of patient condition declining or worsening    Shift Goals  Clinical Goals: ciwa  Patient Goals: rest  Family Goals: manuel    Progress made toward(s) clinical / shift goals:    Problem: Psychosocial  Goal: Patient's level of anxiety will decrease  Outcome: Progressing       Patient is not progressing towards the following goals:

## 2023-04-28 NOTE — PROGRESS NOTES
Hospital Medicine Daily Progress Note    Date of Service  4/28/2023    Chief Complaint  Lizzie Lema is a 82 y.o. female admitted 4/26/2023 with fall    Hospital Course  82 y.o. 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 She presented at [facility], where she was evaluated with CT head showing right-sided 9 mm subdural hematoma.  Patient transferred here for neurosurgery consult.  Neurosurgery was consulted and rec No Neurosurgical intervention at this time. Follow up as outpatient in 4-6 weeks with repeat head CT no contrast. No anticoagulants, antiplatelet medication unless clear clinical indication exists   Per patient she drinks 2 glasses of wine daily    Interval Problem Update  Patient reports R hip pain with limited ROM  Ordered R hip Xray, reviewed the right hip x-ray no acute dislocation or fracture  Patient is po and iv prn narcotics, monitoring respiratory status  On CIWA protocol, monitor CIWA score and respiratory status, no sign of withdrawal at this point.  P.o. 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 No midline shift  Discussed with neurosurgery, no surgical intervention, no indication for Keppra    I have discussed this patient's plan of care and discharge plan at IDT rounds today with Case Management, Nursing, Nursing leadership, and other members of the IDT team.    Consultants/Specialty  neurosurgery and trauma surgery    Code Status  Full Code    Disposition  Patient is not medically cleared for discharge.   Anticipate discharge to to skilled nursing facility.  I have placed the appropriate orders for post-discharge needs.    Review of Systems  Review of Systems   Constitutional:  Positive for malaise/fatigue.   Musculoskeletal:  Positive for falls and joint  pain.   Neurological:  Positive for weakness.   All other systems reviewed and are negative.     Physical Exam  Temp:  [36.9 °C (98.4 °F)-37.1 °C (98.8 °F)] 36.9 °C (98.4 °F)  Pulse:  [79-87] 80  Resp:  [17-18] 17  BP: (111-139)/(58-70) 139/70  SpO2:  [90 %-93 %] 92 %    Physical Exam  Vitals and nursing note reviewed.   Constitutional:       Appearance: Normal appearance. She is ill-appearing.   HENT:      Head: Normocephalic and atraumatic.      Nose: Nose normal.      Mouth/Throat:      Pharynx: Oropharynx is clear.   Eyes:      Extraocular Movements: Extraocular movements intact.      Conjunctiva/sclera: Conjunctivae normal.      Pupils: Pupils are equal, round, and reactive to light.   Cardiovascular:      Rate and Rhythm: Normal rate and regular rhythm.      Pulses: Normal pulses.      Heart sounds: Normal heart sounds.   Pulmonary:      Effort: Pulmonary effort is normal.      Breath sounds: Normal breath sounds.   Abdominal:      General: Abdomen is flat. Bowel sounds are normal.      Palpations: Abdomen is soft.   Musculoskeletal:         General: Tenderness (R hip) present.      Cervical back: Normal range of motion and neck supple.      Comments: R hip limited ROM due to pain   Skin:     General: Skin is warm and dry.   Neurological:      General: No focal deficit present.      Mental Status: She is alert and oriented to person, place, and time. Mental status is at baseline.   Psychiatric:         Mood and Affect: Mood normal.         Behavior: Behavior normal.       Fluids    Intake/Output Summary (Last 24 hours) at 4/28/2023 1636  Last data filed at 4/27/2023 2129  Gross per 24 hour   Intake 1075.31 ml   Output 60 ml   Net 1015.31 ml         Laboratory  Recent Labs     04/26/23  1714 04/27/23  0334 04/28/23  0349   WBC 4.3* 3.6* 3.9*   RBC 3.19* 3.47* 3.44*   HEMOGLOBIN 10.7* 11.6* 11.5*   HEMATOCRIT 32.0* 35.3* 34.2*   .3* 101.7* 99.4*   MCH 33.5* 33.4* 33.4*   MCHC 33.4* 32.9* 33.6   RDW 45.2  46.7 45.4   PLATELETCT 108* 117* 109*   MPV 9.3 9.2 9.6       Recent Labs     04/26/23  1714 04/28/23  0349   SODIUM 139 133*   POTASSIUM 3.3* 3.8   CHLORIDE 104 100   CO2 23 25   GLUCOSE 85 135*   BUN 11 17   CREATININE 0.48* 0.59   CALCIUM 7.0* 8.2*       Recent Labs     04/26/23  1714   APTT 29.3   INR 1.05           Recent Labs     04/26/23  1714   TRIGLYCERIDE 46   HDL 66   LDL 44         Imaging  DX-HIP-COMPLETE - UNILATERAL 2+ RIGHT   Final Result      No acute osseous abnormality.      OUTSIDE IMAGES-CT HEAD   Final Result      CT-FOREIGN FILM CAT SCAN   Final Result      [id number] FILM X-RAY   Final Result             Assessment/Plan  * SDH (subdural hematoma) (HCC)- (present on admission)  Assessment & Plan  Traumatic, secondary to head injury from ground-level fall.    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 No midline shift  Discussed with neurosurgery and trauma surgery  Conservative management recommended. Avoid antiplatelets and anticoagulation.  Discussed with neurosurgery, no indication for Keppra  Maintain normal sodium and blood pressure  PT OT evaluation for frequent falls    Advance care planning  Assessment & Plan  Full Code: I discussed code status with patient . She has medical decision capacity. She wishes to have all life sustaining efforts if needed. I discussed advance care planning with the patient for 16 minutes, including diagnosis, prognosis, plan of care, risks and benefits of any therapies that could be offered, as well as alternatives including palliation and hospice, as appropriate.        Pain of right hip  Assessment & Plan  R hip pain with limited ROM, tenderness. Fall  PT/OT  R hip xray ordered, I reviewed the right hip x-ray independently, no sign of acute dislocation or fracture  Multimodal pain managements including po and iv prn narcotics  Ordered Flexeril as needed    Hypokalemia  Assessment & Plan  P.o.  replacement ordered    Pancytopenia  Assessment & Plan  Platelets 108  WBC 4.3  Hemoglobin 10.7.  .3  ?  Secondary to alcohol abuse  No sign of bleeding, cont monitoring cbc    Alcohol abuse with dependence  Assessment & Plan  Monitor for withdrawal  Alcohol cessation counseling, patient voiced understanding  On iv and po ativan prn, monitoring ciwa score and respiratory status  Start B1, folate and multivitamin         VTE prophylaxis: SCDs/TEDs    I have performed a physical exam and reviewed and updated ROS and Plan today (4/28/2023). In review of yesterday's note (4/27/2023), there are no changes except as documented above.

## 2023-04-29 LAB — MAGNESIUM SERPL-MCNC: 1.6 MG/DL (ref 1.5–2.5)

## 2023-04-29 PROCEDURE — 700102 HCHG RX REV CODE 250 W/ 637 OVERRIDE(OP): Performed by: INTERNAL MEDICINE

## 2023-04-29 PROCEDURE — A9270 NON-COVERED ITEM OR SERVICE: HCPCS | Performed by: INTERNAL MEDICINE

## 2023-04-29 PROCEDURE — 700101 HCHG RX REV CODE 250: Performed by: PHYSICAL MEDICINE & REHABILITATION

## 2023-04-29 PROCEDURE — 83735 ASSAY OF MAGNESIUM: CPT

## 2023-04-29 PROCEDURE — 770020 HCHG ROOM/CARE - TELE (206)

## 2023-04-29 PROCEDURE — A9270 NON-COVERED ITEM OR SERVICE: HCPCS | Performed by: STUDENT IN AN ORGANIZED HEALTH CARE EDUCATION/TRAINING PROGRAM

## 2023-04-29 PROCEDURE — 99232 SBSQ HOSP IP/OBS MODERATE 35: CPT | Performed by: STUDENT IN AN ORGANIZED HEALTH CARE EDUCATION/TRAINING PROGRAM

## 2023-04-29 PROCEDURE — 700102 HCHG RX REV CODE 250 W/ 637 OVERRIDE(OP): Performed by: STUDENT IN AN ORGANIZED HEALTH CARE EDUCATION/TRAINING PROGRAM

## 2023-04-29 PROCEDURE — 36415 COLL VENOUS BLD VENIPUNCTURE: CPT

## 2023-04-29 RX ADMIN — LIDOCAINE 1 PATCH: 50 PATCH TOPICAL at 17:30

## 2023-04-29 RX ADMIN — CYCLOBENZAPRINE 5 MG: 10 TABLET, FILM COATED ORAL at 22:13

## 2023-04-29 RX ADMIN — FOLIC ACID 1 MG: 1 TABLET ORAL at 05:16

## 2023-04-29 RX ADMIN — THERA TABS 1 TABLET: TAB at 05:17

## 2023-04-29 RX ADMIN — DOCUSATE SODIUM 50 MG AND SENNOSIDES 8.6 MG 2 TABLET: 8.6; 5 TABLET, FILM COATED ORAL at 17:31

## 2023-04-29 RX ADMIN — OXYCODONE 5 MG: 5 TABLET ORAL at 22:13

## 2023-04-29 RX ADMIN — DOCUSATE SODIUM 50 MG AND SENNOSIDES 8.6 MG 2 TABLET: 8.6; 5 TABLET, FILM COATED ORAL at 05:16

## 2023-04-29 RX ADMIN — OXYCODONE 5 MG: 5 TABLET ORAL at 09:46

## 2023-04-29 RX ADMIN — Medication 100 MG: at 05:16

## 2023-04-29 RX ADMIN — ACETAMINOPHEN 650 MG: 325 TABLET, FILM COATED ORAL at 09:47

## 2023-04-29 RX ADMIN — CYCLOBENZAPRINE 5 MG: 10 TABLET, FILM COATED ORAL at 09:47

## 2023-04-29 RX ADMIN — BRIMONIDINE TARTRATE 1 DROP: 2 SOLUTION OPHTHALMIC at 05:16

## 2023-04-29 ASSESSMENT — PAIN DESCRIPTION - PAIN TYPE
TYPE: ACUTE PAIN
TYPE: ACUTE PAIN

## 2023-04-29 ASSESSMENT — ENCOUNTER SYMPTOMS
FALLS: 1
WEAKNESS: 1

## 2023-04-29 NOTE — PROGRESS NOTES
Hospital Medicine Daily Progress Note    Date of Service  4/29/2023    Chief Complaint  Lizzie Lema is a 82 y.o. female admitted 4/26/2023 with fall    Hospital Course  82 y.o. 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 She presented at [facility], where she was evaluated with CT head showing right-sided 9 mm subdural hematoma.  Patient transferred here for neurosurgery consult.  Neurosurgery was consulted and rec No Neurosurgical intervention at this time. Follow up as outpatient in 4-6 weeks with repeat head CT no contrast. No anticoagulants, antiplatelet medication unless clear clinical indication exists   Per patient she drinks 2 glasses of wine daily    Interval Problem Update  Patient has no sign of alcohol withdrawal.  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 No midline shift  Discussed with neurosurgery, no surgical intervention, no indication for Keppra    I have discussed this patient's plan of care and discharge plan at IDT rounds today with Case Management, Nursing, Nursing leadership, and other members of the IDT team.    Consultants/Specialty  neurosurgery and trauma surgery    Code Status  Full Code    Disposition  Patient is not medically cleared for discharge.   Anticipate discharge to to skilled nursing facility.  I have placed the appropriate orders for post-discharge needs.    Review of Systems  Review of Systems   Constitutional:  Positive for malaise/fatigue.   Musculoskeletal:  Positive for falls and joint pain.   Neurological:  Positive for weakness.   All other systems reviewed and are negative.     Physical Exam  Temp:  [36.2 °C (97.2  °F)-37.1 °C (98.8 °F)] 36.2 °C (97.2 °F)  Pulse:  [76-92] 83  Resp:  [17-18] 18  BP: (132-155)/(68-87) 133/71  SpO2:  [92 %-97 %] 95 %    Physical Exam  Vitals and nursing note reviewed.   Constitutional:       Appearance: Normal appearance. She is ill-appearing.   HENT:      Head: Normocephalic and atraumatic.      Nose: Nose normal.      Mouth/Throat:      Pharynx: Oropharynx is clear.   Eyes:      Extraocular Movements: Extraocular movements intact.      Conjunctiva/sclera: Conjunctivae normal.      Pupils: Pupils are equal, round, and reactive to light.   Cardiovascular:      Rate and Rhythm: Normal rate and regular rhythm.      Pulses: Normal pulses.      Heart sounds: Normal heart sounds.   Pulmonary:      Effort: Pulmonary effort is normal.      Breath sounds: Normal breath sounds.   Abdominal:      General: Abdomen is flat. Bowel sounds are normal.      Palpations: Abdomen is soft.   Musculoskeletal:         General: Tenderness (R hip) present.      Cervical back: Normal range of motion and neck supple.      Comments: R hip limited ROM due to pain   Skin:     General: Skin is warm and dry.   Neurological:      General: No focal deficit present.      Mental Status: She is alert and oriented to person, place, and time. Mental status is at baseline.   Psychiatric:         Mood and Affect: Mood normal.         Behavior: Behavior normal.       Fluids    Intake/Output Summary (Last 24 hours) at 4/29/2023 1551  Last data filed at 4/29/2023 0900  Gross per 24 hour   Intake 240 ml   Output --   Net 240 ml         Laboratory  Recent Labs     04/26/23  1714 04/27/23  0334 04/28/23  0349   WBC 4.3* 3.6* 3.9*   RBC 3.19* 3.47* 3.44*   HEMOGLOBIN 10.7* 11.6* 11.5*   HEMATOCRIT 32.0* 35.3* 34.2*   .3* 101.7* 99.4*   MCH 33.5* 33.4* 33.4*   MCHC 33.4* 32.9* 33.6   RDW 45.2 46.7 45.4   PLATELETCT 108* 117* 109*   MPV 9.3 9.2 9.6       Recent Labs     04/26/23  1714 04/28/23  0349   SODIUM 139 133*   POTASSIUM 3.3* 3.8    CHLORIDE 104 100   CO2 23 25   GLUCOSE 85 135*   BUN 11 17   CREATININE 0.48* 0.59   CALCIUM 7.0* 8.2*       Recent Labs     04/26/23  1714   APTT 29.3   INR 1.05           Recent Labs     04/26/23  1714   TRIGLYCERIDE 46   HDL 66   LDL 44         Imaging  DX-HIP-COMPLETE - UNILATERAL 2+ RIGHT   Final Result      No acute osseous abnormality.      OUTSIDE IMAGES-CT HEAD   Final Result      CT-FOREIGN FILM CAT SCAN   Final Result      [id number] FILM X-RAY   Final Result             Assessment/Plan  * SDH (subdural hematoma) (HCC)- (present on admission)  Assessment & Plan  Traumatic, secondary to head injury from ground-level fall.    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 No midline shift  Discussed with neurosurgery and trauma surgery  Conservative management recommended. Avoid antiplatelets and anticoagulation.  Discussed with neurosurgery, no indication for Keppra  Maintain normal sodium and blood pressure  PT OT evaluation for frequent falls    Advance care planning  Assessment & Plan  Full Code: I discussed code status with patient . She has medical decision capacity. She wishes to have all life sustaining efforts if needed. I discussed advance care planning with the patient for 16 minutes, including diagnosis, prognosis, plan of care, risks and benefits of any therapies that could be offered, as well as alternatives including palliation and hospice, as appropriate.        Pain of right hip  Assessment & Plan  R hip pain with limited ROM, tenderness. Fall  PT/OT  R hip xray ordered, I reviewed the right hip x-ray independently, no sign of acute dislocation or fracture  Multimodal pain managements including po and iv prn narcotics  Ordered Flexeril as needed    Hypokalemia  Assessment & Plan  P.o. replacement ordered    Pancytopenia  Assessment & Plan  Platelets 108  WBC 4.3  Hemoglobin 10.7.  .3  ?  Secondary to alcohol abuse  No sign  of bleeding, cont monitoring cbc    Alcohol abuse with dependence  Assessment & Plan  Monitor for withdrawal  Alcohol cessation counseling, patient voiced understanding  On iv and po ativan prn, monitoring ciwa score and respiratory status  Start B1, folate and multivitamin  4/29: Patient has no sign of alcohol withdrawal.  DC CIWA protocol.  Discussed with pharmacy         VTE prophylaxis: SCDs/TEDs    I have performed a physical exam and reviewed and updated ROS and Plan today (4/29/2023). In review of yesterday's note (4/28/2023), there are no changes except as documented above.

## 2023-04-29 NOTE — CARE PLAN
The patient is Stable - Low risk of patient condition declining or worsening    Shift Goals  Clinical Goals: pain mangement, safety  Patient Goals: pain control, sleep  Family Goals: manuel    Progress made toward(s) clinical / shift goals:    Problem: Fall Risk  Goal: Patient will remain free from falls  Outcome: Progressing     Problem: Pain - Standard  Goal: Alleviation of pain or a reduction in pain to the patient’s comfort goal  Outcome: Progressing     Problem: Skin Integrity  Goal: Skin integrity is maintained or improved  Outcome: Progressing       Patient is not progressing towards the following goals:

## 2023-04-29 NOTE — CARE PLAN
The patient is Watcher - Medium risk of patient condition declining or worsening    Shift Goals  Clinical Goals: pain management  Patient Goals: pain control  Family Goals: manuel    Progress made toward(s) clinical / shift goals:    Problem: Psychosocial  Goal: Patient's level of anxiety will decrease  Outcome: Progressing     Problem: Psychosocial  Goal: Patient's level of anxiety will decrease  Outcome: Progressing       Patient is not progressing towards the following goals:

## 2023-04-29 NOTE — CARE PLAN
The patient is Watcher - Medium risk of patient condition declining or worsening    Shift Goals  Clinical Goals: pain management  Patient Goals: pain free  Family Goals: manuel    Progress made toward(s) clinical / shift goals:    Problem: Knowledge Deficit - Standard  Goal: Patient and family/care givers will demonstrate understanding of plan of care, disease process/condition, diagnostic tests and medications  Outcome: Progressing       Patient is not progressing towards the following goals:

## 2023-04-30 LAB
ANION GAP SERPL CALC-SCNC: 8 MMOL/L (ref 7–16)
BUN SERPL-MCNC: 16 MG/DL (ref 8–22)
CALCIUM SERPL-MCNC: 8.5 MG/DL (ref 8.5–10.5)
CHLORIDE SERPL-SCNC: 97 MMOL/L (ref 96–112)
CO2 SERPL-SCNC: 28 MMOL/L (ref 20–33)
CREAT SERPL-MCNC: 0.43 MG/DL (ref 0.5–1.4)
ERYTHROCYTE [DISTWIDTH] IN BLOOD BY AUTOMATED COUNT: 44.8 FL (ref 35.9–50)
GFR SERPLBLD CREATININE-BSD FMLA CKD-EPI: 95 ML/MIN/1.73 M 2
GLUCOSE SERPL-MCNC: 147 MG/DL (ref 65–99)
HCT VFR BLD AUTO: 34.6 % (ref 37–47)
HGB BLD-MCNC: 11.5 G/DL (ref 12–16)
MCH RBC QN AUTO: 33.3 PG (ref 27–33)
MCHC RBC AUTO-ENTMCNC: 33.2 G/DL (ref 33.6–35)
MCV RBC AUTO: 100.3 FL (ref 81.4–97.8)
PLATELET # BLD AUTO: 145 K/UL (ref 164–446)
PMV BLD AUTO: 9.3 FL (ref 9–12.9)
POTASSIUM SERPL-SCNC: 3.5 MMOL/L (ref 3.6–5.5)
RBC # BLD AUTO: 3.45 M/UL (ref 4.2–5.4)
SODIUM SERPL-SCNC: 133 MMOL/L (ref 135–145)
WBC # BLD AUTO: 3.6 K/UL (ref 4.8–10.8)

## 2023-04-30 PROCEDURE — 85027 COMPLETE CBC AUTOMATED: CPT

## 2023-04-30 PROCEDURE — A9270 NON-COVERED ITEM OR SERVICE: HCPCS | Performed by: INTERNAL MEDICINE

## 2023-04-30 PROCEDURE — 36415 COLL VENOUS BLD VENIPUNCTURE: CPT

## 2023-04-30 PROCEDURE — 700102 HCHG RX REV CODE 250 W/ 637 OVERRIDE(OP): Performed by: STUDENT IN AN ORGANIZED HEALTH CARE EDUCATION/TRAINING PROGRAM

## 2023-04-30 PROCEDURE — 700101 HCHG RX REV CODE 250: Performed by: INTERNAL MEDICINE

## 2023-04-30 PROCEDURE — 700101 HCHG RX REV CODE 250: Performed by: PHYSICAL MEDICINE & REHABILITATION

## 2023-04-30 PROCEDURE — 770020 HCHG ROOM/CARE - TELE (206)

## 2023-04-30 PROCEDURE — A9270 NON-COVERED ITEM OR SERVICE: HCPCS | Performed by: STUDENT IN AN ORGANIZED HEALTH CARE EDUCATION/TRAINING PROGRAM

## 2023-04-30 PROCEDURE — 80048 BASIC METABOLIC PNL TOTAL CA: CPT

## 2023-04-30 PROCEDURE — 700102 HCHG RX REV CODE 250 W/ 637 OVERRIDE(OP): Performed by: INTERNAL MEDICINE

## 2023-04-30 PROCEDURE — 99232 SBSQ HOSP IP/OBS MODERATE 35: CPT | Performed by: STUDENT IN AN ORGANIZED HEALTH CARE EDUCATION/TRAINING PROGRAM

## 2023-04-30 RX ORDER — POTASSIUM CHLORIDE 20 MEQ/1
40 TABLET, EXTENDED RELEASE ORAL ONCE
Status: COMPLETED | OUTPATIENT
Start: 2023-04-30 | End: 2023-04-30

## 2023-04-30 RX ADMIN — POTASSIUM CHLORIDE 40 MEQ: 1500 TABLET, EXTENDED RELEASE ORAL at 08:44

## 2023-04-30 RX ADMIN — BRIMONIDINE TARTRATE 1 DROP: 2 SOLUTION OPHTHALMIC at 05:00

## 2023-04-30 RX ADMIN — Medication 100 MG: at 05:00

## 2023-04-30 RX ADMIN — OXYCODONE 5 MG: 5 TABLET ORAL at 05:00

## 2023-04-30 RX ADMIN — DOCUSATE SODIUM 50 MG AND SENNOSIDES 8.6 MG 2 TABLET: 8.6; 5 TABLET, FILM COATED ORAL at 05:00

## 2023-04-30 RX ADMIN — LABETALOL HYDROCHLORIDE 10 MG: 5 INJECTION INTRAVENOUS at 19:59

## 2023-04-30 RX ADMIN — THERA TABS 1 TABLET: TAB at 05:00

## 2023-04-30 RX ADMIN — FOLIC ACID 1 MG: 1 TABLET ORAL at 05:00

## 2023-04-30 RX ADMIN — LIDOCAINE 1 PATCH: 50 PATCH TOPICAL at 18:25

## 2023-04-30 ASSESSMENT — PAIN DESCRIPTION - PAIN TYPE
TYPE: ACUTE PAIN
TYPE: ACUTE PAIN

## 2023-04-30 ASSESSMENT — ENCOUNTER SYMPTOMS
FALLS: 1
WEAKNESS: 1

## 2023-04-30 NOTE — PROGRESS NOTES
Hospital Medicine Daily Progress Note    Date of Service  4/30/2023    Chief Complaint  Lizzie Lema is a 82 y.o. female admitted 4/26/2023 with fall    Hospital Course  82 y.o. 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 She presented at [facility], where she was evaluated with CT head showing right-sided 9 mm subdural hematoma.  Patient transferred here for neurosurgery consult.  Neurosurgery was consulted and rec No Neurosurgical intervention at this time. Follow up as outpatient in 4-6 weeks with repeat head CT no contrast. No anticoagulants, antiplatelet medication unless clear clinical indication exists   Per patient she drinks 2 glasses of wine daily    Interval Problem Update  Patient has no sign of alcohol withdrawal.  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 No midline shift  Discussed with neurosurgery, no surgical intervention, no indication for Keppra  Pending placement    I have discussed this patient's plan of care and discharge plan at IDT rounds today with Case Management, Nursing, Nursing leadership, and other members of the IDT team.    Consultants/Specialty  neurosurgery and trauma surgery    Code Status  Full Code    Disposition  Patient is not medically cleared for discharge.   Anticipate discharge to to skilled nursing facility.  I have placed the appropriate orders for post-discharge needs.    Review of Systems  Review of Systems   Constitutional:  Positive for malaise/fatigue.   Musculoskeletal:  Positive for falls and joint pain.   Neurological:  Positive for weakness.   All other systems reviewed and are negative.     Physical Exam  Temp:   [36 °C (96.8 °F)-36.5 °C (97.7 °F)] 36 °C (96.8 °F)  Pulse:  [75-91] 84  Resp:  [17-18] 18  BP: (100-134)/(56-74) 109/62  SpO2:  [92 %-95 %] 95 %    Physical Exam  Vitals and nursing note reviewed.   Constitutional:       Appearance: Normal appearance. She is ill-appearing.   HENT:      Head: Normocephalic and atraumatic.      Nose: Nose normal.      Mouth/Throat:      Pharynx: Oropharynx is clear.   Eyes:      Extraocular Movements: Extraocular movements intact.      Conjunctiva/sclera: Conjunctivae normal.      Pupils: Pupils are equal, round, and reactive to light.   Cardiovascular:      Rate and Rhythm: Normal rate and regular rhythm.      Pulses: Normal pulses.      Heart sounds: Normal heart sounds.   Pulmonary:      Effort: Pulmonary effort is normal.      Breath sounds: Normal breath sounds.   Abdominal:      General: Abdomen is flat. Bowel sounds are normal.      Palpations: Abdomen is soft.   Musculoskeletal:         General: Tenderness (R hip) present.      Cervical back: Normal range of motion and neck supple.      Comments: R hip limited ROM due to pain   Skin:     General: Skin is warm and dry.   Neurological:      General: No focal deficit present.      Mental Status: She is alert and oriented to person, place, and time. Mental status is at baseline.   Psychiatric:         Mood and Affect: Mood normal.         Behavior: Behavior normal.       Fluids    Intake/Output Summary (Last 24 hours) at 4/30/2023 1337  Last data filed at 4/30/2023 0852  Gross per 24 hour   Intake 240 ml   Output --   Net 240 ml         Laboratory  Recent Labs     04/28/23  0349 04/30/23 0220   WBC 3.9* 3.6*   RBC 3.44* 3.45*   HEMOGLOBIN 11.5* 11.5*   HEMATOCRIT 34.2* 34.6*   MCV 99.4* 100.3*   MCH 33.4* 33.3*   MCHC 33.6 33.2*   RDW 45.4 44.8   PLATELETCT 109* 145*   MPV 9.6 9.3       Recent Labs     04/28/23  0349 04/30/23 0220   SODIUM 133* 133*   POTASSIUM 3.8 3.5*   CHLORIDE 100 97   CO2 25 28   GLUCOSE 135* 147*   BUN  17 16   CREATININE 0.59 0.43*   CALCIUM 8.2* 8.5                         Imaging  DX-HIP-COMPLETE - UNILATERAL 2+ RIGHT   Final Result      No acute osseous abnormality.      OUTSIDE IMAGES-CT HEAD   Final Result      CT-FOREIGN FILM CAT SCAN   Final Result      [id number] FILM X-RAY   Final Result             Assessment/Plan  * SDH (subdural hematoma) (HCC)- (present on admission)  Assessment & Plan  Traumatic, secondary to head injury from ground-level fall.    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 No midline shift  Discussed with neurosurgery and trauma surgery  Conservative management recommended. Avoid antiplatelets and anticoagulation.  Discussed with neurosurgery, no indication for Keppra  Maintain normal sodium and blood pressure  PT/OT  Fall precautions    Advance care planning  Assessment & Plan  Full Code: I discussed code status with patient . She has medical decision capacity. She wishes to have all life sustaining efforts if needed. I discussed advance care planning with the patient for 16 minutes, including diagnosis, prognosis, plan of care, risks and benefits of any therapies that could be offered, as well as alternatives including palliation and hospice, as appropriate.        Pain of right hip  Assessment & Plan  R hip pain with limited ROM, tenderness. Fall  PT/OT  R hip xray ordered, I reviewed the right hip x-ray independently, no sign of acute dislocation or fracture  Multimodal pain managements including po and iv prn narcotics  Ordered Flexeril as needed    Hypokalemia  Assessment & Plan  K 3.5, ordered oral replacement    Pancytopenia  Assessment & Plan  Platelets 108  WBC 4.3  Hemoglobin 10.7.  .3  ?  Secondary to alcohol abuse  No sign of bleeding, cont monitoring cbc    Alcohol abuse with dependence  Assessment & Plan  Monitor for withdrawal  Alcohol cessation counseling, patient voiced understanding  On iv and  po ativan prn, monitoring ciwa score and respiratory status  Start B1, folate and multivitamin  4/29: Patient has no sign of alcohol withdrawal.  DC CIWA protocol.  Discussed with pharmacy         VTE prophylaxis: SCDs/TEDs    I have performed a physical exam and reviewed and updated ROS and Plan today (4/30/2023). In review of yesterday's note (4/29/2023), there are no changes except as documented above.

## 2023-04-30 NOTE — PROGRESS NOTES
Monitor Summary: SR 74-93 NE 0.15 QRS 0.06 QT 0.38 with rare PVC and couplets per strip from monitor room.

## 2023-04-30 NOTE — CARE PLAN
The patient is Stable - Low risk of patient condition declining or worsening    Shift Goals  Clinical Goals: pain mangement  Patient Goals: pain mangement  Family Goals: manuel    Progress made toward(s) clinical / shift goals:    Problem: Risk for Aspiration  Goal: Patient's risk for aspiration will be absent or decrease  Outcome: Progressing     Problem: Pain - Standard  Goal: Alleviation of pain or a reduction in pain to the patient’s comfort goal  Outcome: Progressing   PRN pain medication administered as ordered.   Problem: Fall Risk  Goal: Patient will remain free from falls  Outcome: Progressing   Fall precautions in place. Bed in lowest position and locked. Bed alarm in place.   Problem: Skin Integrity  Goal: Skin integrity is maintained or improved  Outcome: Progressing   Patient able to turn self. Reminders encouraged.     Patient is not progressing towards the following goals:

## 2023-04-30 NOTE — CARE PLAN
The patient is Stable - Low risk of patient condition declining or worsening    Shift Goals  Clinical Goals: Increase mobility & PO intake  Patient Goals: Pain management  Family Goals: JOHNNIE    Progress made toward(s) clinical / shift goals:     Problem: Fall Risk  Goal: Patient will remain free from falls  Outcome: Progressing  Bed low and locked, alarm set, call bell within reach.     Problem: Skin Integrity  Goal: Skin integrity is maintained or improved  Outcome: Progressing  Pt encouraged to turn and position in bed and get oob with assistance.       Patient is not progressing towards the following goals: N/A

## 2023-04-30 NOTE — PROGRESS NOTES
Monitor summary: SR 81-90, IL 0.17, QRS 0.04, QT 0.37, with rare PVCs per strip from monitor room.

## 2023-04-30 NOTE — PROGRESS NOTES
Assumed care of pt at 0700. Pt alert and oriented x3 (disoriented to time). PERRLA and extremity strength equal bilaterally. She is not scoring on CIWA. She c/o right hip pain with movement; declines any relief measures. She turns and positions self well in bed. Bed low and locked, alarm set and call bell within reach. Hourly rounding continues.

## 2023-05-01 ENCOUNTER — PATIENT OUTREACH (OUTPATIENT)
Dept: SCHEDULING | Facility: IMAGING CENTER | Age: 83
End: 2023-05-01
Payer: COMMERCIAL

## 2023-05-01 PROCEDURE — A9270 NON-COVERED ITEM OR SERVICE: HCPCS | Performed by: STUDENT IN AN ORGANIZED HEALTH CARE EDUCATION/TRAINING PROGRAM

## 2023-05-01 PROCEDURE — 97530 THERAPEUTIC ACTIVITIES: CPT

## 2023-05-01 PROCEDURE — 700102 HCHG RX REV CODE 250 W/ 637 OVERRIDE(OP): Performed by: STUDENT IN AN ORGANIZED HEALTH CARE EDUCATION/TRAINING PROGRAM

## 2023-05-01 PROCEDURE — 99232 SBSQ HOSP IP/OBS MODERATE 35: CPT | Performed by: STUDENT IN AN ORGANIZED HEALTH CARE EDUCATION/TRAINING PROGRAM

## 2023-05-01 PROCEDURE — 700102 HCHG RX REV CODE 250 W/ 637 OVERRIDE(OP): Performed by: INTERNAL MEDICINE

## 2023-05-01 PROCEDURE — 770020 HCHG ROOM/CARE - TELE (206)

## 2023-05-01 PROCEDURE — A9270 NON-COVERED ITEM OR SERVICE: HCPCS | Performed by: INTERNAL MEDICINE

## 2023-05-01 PROCEDURE — 97535 SELF CARE MNGMENT TRAINING: CPT | Mod: CO

## 2023-05-01 PROCEDURE — 700101 HCHG RX REV CODE 250: Performed by: PHYSICAL MEDICINE & REHABILITATION

## 2023-05-01 RX ORDER — POTASSIUM CHLORIDE 20 MEQ/1
40 TABLET, EXTENDED RELEASE ORAL ONCE
Status: COMPLETED | OUTPATIENT
Start: 2023-05-01 | End: 2023-05-01

## 2023-05-01 RX ADMIN — POTASSIUM CHLORIDE 40 MEQ: 1500 TABLET, EXTENDED RELEASE ORAL at 08:14

## 2023-05-01 RX ADMIN — CYCLOBENZAPRINE 5 MG: 10 TABLET, FILM COATED ORAL at 20:24

## 2023-05-01 RX ADMIN — OXYCODONE 5 MG: 5 TABLET ORAL at 18:08

## 2023-05-01 RX ADMIN — LIDOCAINE 1 PATCH: 50 PATCH TOPICAL at 18:09

## 2023-05-01 RX ADMIN — DOCUSATE SODIUM 50 MG AND SENNOSIDES 8.6 MG 2 TABLET: 8.6; 5 TABLET, FILM COATED ORAL at 18:09

## 2023-05-01 RX ADMIN — BRIMONIDINE TARTRATE 1 DROP: 2 SOLUTION OPHTHALMIC at 04:14

## 2023-05-01 RX ADMIN — OXYCODONE 5 MG: 5 TABLET ORAL at 08:28

## 2023-05-01 ASSESSMENT — COGNITIVE AND FUNCTIONAL STATUS - GENERAL
WALKING IN HOSPITAL ROOM: A LOT
SUGGESTED CMS G CODE MODIFIER DAILY ACTIVITY: CK
DAILY ACTIVITIY SCORE: 17
DRESSING REGULAR UPPER BODY CLOTHING: A LITTLE
STANDING UP FROM CHAIR USING ARMS: A LITTLE
TOILETING: A LOT
MOBILITY SCORE: 11
CLIMB 3 TO 5 STEPS WITH RAILING: TOTAL
MOVING FROM LYING ON BACK TO SITTING ON SIDE OF FLAT BED: UNABLE
TURNING FROM BACK TO SIDE WHILE IN FLAT BAD: A LITTLE
MOVING TO AND FROM BED TO CHAIR: UNABLE
HELP NEEDED FOR BATHING: A LOT
SUGGESTED CMS G CODE MODIFIER MOBILITY: CL
DRESSING REGULAR LOWER BODY CLOTHING: A LOT

## 2023-05-01 ASSESSMENT — PAIN DESCRIPTION - PAIN TYPE
TYPE: CHRONIC PAIN
TYPE: ACUTE PAIN

## 2023-05-01 ASSESSMENT — ENCOUNTER SYMPTOMS
FALLS: 1
WEAKNESS: 1

## 2023-05-01 ASSESSMENT — GAIT ASSESSMENTS: GAIT LEVEL OF ASSIST: UNABLE TO PARTICIPATE

## 2023-05-01 NOTE — DISCHARGE PLANNING
CM contacted [place] [employer] #[phone] to request assistance with PASRR due to error message received & was informed that patient already has a valid PASRR under last name of NAHID; PASRR [id number].

## 2023-05-01 NOTE — PROGRESS NOTES
Monitor summary: SR 75-97, [place] 0.16, QRS 0.04, QT 0.33, with rare PVCs per strip from monitor room.

## 2023-05-01 NOTE — PROGRESS NOTES
Hospital Medicine Daily Progress Note    Date of Service  5/1/2023    Chief Complaint  Lizzie Lema is a 82 y.o. female admitted 4/26/2023 with fall    Hospital Course  82 y.o. 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 She presented at [facility], where she was evaluated with CT head showing right-sided 9 mm subdural hematoma.  Patient transferred here for neurosurgery consult.  Neurosurgery was consulted and rec No Neurosurgical intervention at this time. Follow up as outpatient in 4-6 weeks with repeat head CT no contrast. No anticoagulants, antiplatelet medication unless clear clinical indication exists   Per patient she drinks 2 glasses of wine daily    Interval Problem Update  Patient has no sign of alcohol withdrawal.  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 No midline shift  Discussed with neurosurgery, no surgical intervention, no indication for Keppra  Pending placement    I have discussed this patient's plan of care and discharge plan at IDT rounds today with Case Management, Nursing, Nursing leadership, and other members of the IDT team.    Consultants/Specialty  neurosurgery and trauma surgery    Code Status  Full Code    Disposition  Patient is medically cleared for discharge.   Anticipate discharge to to skilled nursing facility.  I have placed the appropriate orders for post-discharge needs.    Review of Systems  Review of Systems   Constitutional:  Positive for malaise/fatigue.   Musculoskeletal:  Positive for falls and joint pain.   Neurological:  Positive for weakness.   All other systems reviewed and are negative.      Physical Exam  Temp:  [36.4 °C (97.5 °F)-37.1 °C (98.8 °F)] 37 °C (98.6 °F)  Pulse:  [] 90  Resp:  [16-18] 17  BP: (112-158)/() 135/75  SpO2:  [93 %-96 %] 94 %    Physical Exam  Vitals and nursing note reviewed.   Constitutional:       Appearance: Normal appearance. She is ill-appearing.   HENT:      Head: Normocephalic and atraumatic.      Nose: Nose normal.      Mouth/Throat:      Pharynx: Oropharynx is clear.   Eyes:      Extraocular Movements: Extraocular movements intact.      Conjunctiva/sclera: Conjunctivae normal.      Pupils: Pupils are equal, round, and reactive to light.   Cardiovascular:      Rate and Rhythm: Normal rate and regular rhythm.      Pulses: Normal pulses.      Heart sounds: Normal heart sounds.   Pulmonary:      Effort: Pulmonary effort is normal.      Breath sounds: Normal breath sounds.   Abdominal:      General: Abdomen is flat. Bowel sounds are normal.      Palpations: Abdomen is soft.   Musculoskeletal:         General: Tenderness (R hip) present.      Cervical back: Normal range of motion and neck supple.      Comments: R hip limited ROM due to pain   Skin:     General: Skin is warm and dry.   Neurological:      General: No focal deficit present.      Mental Status: She is alert and oriented to person, place, and time. Mental status is at baseline.   Psychiatric:         Mood and Affect: Mood normal.         Behavior: Behavior normal.       Fluids    Intake/Output Summary (Last 24 hours) at 5/1/2023 1213  Last data filed at 5/1/2023 0800  Gross per 24 hour   Intake 120 ml   Output --   Net 120 ml         Laboratory  Recent Labs     04/30/23  0220   WBC 3.6*   RBC 3.45*   HEMOGLOBIN 11.5*   HEMATOCRIT 34.6*   .3*   MCH 33.3*   MCHC 33.2*   RDW 44.8   PLATELETCT 145*   MPV 9.3       Recent Labs     04/30/23  0220   SODIUM 133*   POTASSIUM 3.5*   CHLORIDE 97   CO2 28   GLUCOSE 147*   BUN 16   CREATININE 0.43*   CALCIUM 8.5                          Imaging  DX-HIP-COMPLETE - UNILATERAL 2+ RIGHT   Final Result      No acute osseous abnormality.      OUTSIDE IMAGES-CT HEAD   Final Result      CT-FOREIGN FILM CAT SCAN   Final Result      WE-[id number] FILM X-RAY   Final Result             Assessment/Plan  * SDH (subdural hematoma) (HCC)- (present on admission)  Assessment & Plan  Traumatic, secondary to head injury from ground-level fall.    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 No midline shift  Discussed with neurosurgery and trauma surgery  Conservative management recommended. Avoid antiplatelets and anticoagulation.  Discussed with neurosurgery, no indication for Keppra  Maintain normal sodium and blood pressure  PT/OT  Fall precautions  5/1: Continue monitoring normotensive blood pressure and normal sodium. PT/OT    Advance care planning  Assessment & Plan  Full Code: I discussed code status with patient . She has medical decision capacity. She wishes to have all life sustaining efforts if needed. I discussed advance care planning with the patient for 16 minutes, including diagnosis, prognosis, plan of care, risks and benefits of any therapies that could be offered, as well as alternatives including palliation and hospice, as appropriate.        Pain of right hip  Assessment & Plan  R hip pain with limited ROM, tenderness. Fall  PT/OT  R hip xray ordered, I reviewed the right hip x-ray independently, no sign of acute dislocation or fracture  Multimodal pain managements including po and iv prn narcotics  Ordered Flexeril as needed  Ordered lidocaine patch for pain control with p.o. oxycodone as needed      Hypokalemia  Assessment & Plan  K 3.5, ordered oral replacement    Pancytopenia  Assessment & Plan  Platelets 108  WBC 4.3  Hemoglobin 10.7.  .3  ?  Secondary to alcohol abuse  No sign of bleeding, cont monitoring cbc    Alcohol abuse with dependence  Assessment & Plan  Monitor  for withdrawal  Alcohol cessation counseling, patient voiced understanding  On iv and po ativan prn, monitoring ciwa score and respiratory status  Start B1, folate and multivitamin  4/29: Patient has no sign of alcohol withdrawal.  DC CIWA protocol.  Discussed with pharmacy         VTE prophylaxis: SCDs/TEDs    I have performed a physical exam and reviewed and updated ROS and Plan today (5/1/2023). In review of yesterday's note (4/30/2023), there are no changes except as documented above.

## 2023-05-01 NOTE — CARE PLAN
The patient is Stable - Low risk of patient condition declining or worsening    Shift Goals  Clinical Goals: Stable neuro status, safety  Patient Goals: Sleep  Family Goals: JOHNNIE    Progress made toward(s) clinical / shift goals:    Problem: Knowledge Deficit - Standard  Goal: Patient and family/care givers will demonstrate understanding of plan of care, disease process/condition, diagnostic tests and medications  Outcome: Progressing     Problem: Seizure Precautions  Goal: Implementation of seizure precautions  Outcome: Progressing     Problem: Fall Risk  Goal: Patient will remain free from falls  Outcome: Progressing     Problem: Skin Integrity  Goal: Skin integrity is maintained or improved  Outcome: Progressing       Patient is not progressing towards the following goals:

## 2023-05-01 NOTE — DIETARY
Nutrition Services Brief Update:    Day 5 of admit.  Lizzie Lema is a 82 y.o. female with admitting DX of SDH.     Pt is on regular diet with Boost [employer] BID. Pt is eating <25% meals but % of supplements. Weight is stable, no wounds or edema noted. Labs/meds reviewed. Will increase Boost to with meals as pt is mainly drinking shakes. Last BM: 4/30    Problem: Nutritional:  Goal: Achieve adequate nutritional intake  Description: Patient will consume 25-50% of meals  Outcome: Progressing     RD following.

## 2023-05-01 NOTE — DISCHARGE PLANNING
Agency/Facility Name: [facility]  Outcome: DPA left vmail with [facility] in admissions asking them to start insurance auth on pt      [id number]    Agency/Facility Name: [facility]  Spoke To: Chrsity  Outcome: DPA called facility to ask facility to start insurance auth. Per Michaelle, she will start auth process today

## 2023-05-01 NOTE — PROGRESS NOTES
Monitor summary: SR-ST , MA .16, QRS .05, QT .26 with rare PVCs per strip from monitor room.

## 2023-05-01 NOTE — THERAPY
Occupational Therapy  Daily Treatment     Patient Name: Lizzie Lema  Age:  82 y.o., Sex:  female  Medical Record #: [id number]  Today's Date: 5/1/2023       Precautions: Fall Risk    Assessment    Pt seen for OT tx. Continues to be limited by decreased activity tolerance and pain in R hip impacting ability to complete ADLs and ADL transfers independently. SBA supine > sit, completed seated grooming w/ setup and supv. Min A sit > stand, min A txf from EOB > chair/BSC. Encouraged pt to mobilize w/ nrsg staff as tolerated. Will continue to follow while in house.     Plan    Treatment Plan Status: Continue Current Treatment Plan    DC Equipment Recommendations: Unable to determine at this time  Discharge Recommendations: Recommend post-acute placement for additional occupational therapy services prior to discharge home       05/01/23 1325   Balance   Sitting Balance (Static) Fair +   Sitting Balance (Dynamic) Fair   Standing Balance (Static) Fair -   Standing Balance (Dynamic) Poor +   Weight Shift Sitting Fair   Weight Shift Standing Poor   Bed Mobility    Supine to Sit Standby Assist   Sit to Supine   (left up in chair)   Activities of Daily Living   Grooming Supervision;Seated   Upper Body Dressing Supervision   Lower Body Dressing Maximal Assist   Toileting Maximal Assist   Functional Mobility   Sit to Stand Minimal Assist   Bed, Chair, Wheelchair Transfer Minimal Assist   Toilet Transfers Minimal Assist   Short Term Goals   Short Term Goal # 1 Pt will complete ADL transfers with supervision   Goal Outcome # 1 Progressing as expected   Short Term Goal # 2 Pt will complete LB dressing with supervision   Goal Outcome # 2 Goal not met   Short Term Goal # 3 Pt will complete toileting with supervision   Goal Outcome # 3 Goal not met   Anticipated Discharge Equipment and Recommendations   DC Equipment Recommendations Unable to determine at this time   Discharge Recommendations Recommend post-acute placement for  additional occupational therapy services prior to discharge home

## 2023-05-01 NOTE — THERAPY
"Physical Therapy   Daily Treatment     Patient Name: Lizzie Lema  Age:  82 y.o., Sex:  female  Medical Record #: [id number]  Today's Date: 5/1/2023     Precautions  Precautions: (P) Fall Risk    Assessment    Pt is agreeable to participation in therapy session and tolerates session well; pt continues to be limited primarily by pain in R hip and associated deficits in strength/balance/endurance, however pt demonstrates improved bed mobility and transfers, improved standing tolerance. Pt will benefit from continued PT services in acute setting, as well as in post acute setting. Sit to stand with CGA/min assist, max verbal cues and min assist for stand pivot to chair from EOB; pt unable to functionally weight shift to R LE adequately to lift L LE off of ground for stepping, despite attempts/effort. Pt requires min assist for R LE only during supine to sit, is able to scoot to EOB mod I.         Plan    Treatment Plan Status: (P) Continue Current Treatment Plan  Type of Treatment: Bed Mobility, Gait Training, Neuro Re-Education / Balance, Stair Training, Therapeutic Activities, Therapeutic Exercise, Self Care / Home Evaluation  Treatment Frequency: 3 Times per Week  Treatment Duration: Until Therapy Goals Met    DC Equipment Recommendations: (P) Front-Wheel Walker  Discharge Recommendations: (P) Recommend post-acute placement for additional physical therapy services prior to discharge home      Subjective    \"I think maybe I am a little stronger?\"     Objective       05/01/23 0949   Charge Group   Charges  Yes   PT Therapeutic Activities (Units) 1   Total Time Spent   PT Total Time Yes   PT Therapeutic Activities Time Spent (Mins) 20    Services   Is patient using  services for this encounter? No   Precautions   Precautions Fall Risk   Vitals   O2 Delivery Device None - Room Air   Pain 0 - 10 Group   Location Hip   Location Orientation Right   Therapist Pain Assessment During Activity "   Cognition    Cognition / Consciousness WDL   Level of Consciousness Alert   Comments pleasant and cooperative; pt appears to be more aware of deficits   Strength Lower Body   Lower Body Strength  X   Gross Strength Generalized Weakness, Equal Bilaterally   Balance   Sitting Balance (Static) Fair +   Sitting Balance (Dynamic) Fair   Standing Balance (Static) Fair -   Standing Balance (Dynamic) Poor +   Weight Shift Sitting Fair   Weight Shift Standing Poor   Skilled Intervention Verbal Cuing;Tactile Cuing;Sequencing;Compensatory Strategies   Comments   (weight shifting limited by pain in R hip)   Bed Mobility    Supine to Sit Minimal Assist  (with R LE only)   Scooting Standby Assist   Skilled Intervention Verbal Cuing;Sequencing   Gait Analysis   Gait Level Of Assist Unable to Participate  (attempted, limited by pain with weight shifting)   Functional Mobility   Sit to Stand Minimal Assist   Bed, Chair, Wheelchair Transfer Minimal Assist   Transfer Method Stand Pivot  (slides R LE to pivot/laterally)   Skilled Intervention Verbal Cuing;Sequencing;Compensatory Strategies   How much difficulty does the patient currently have...   Turning over in bed (including adjusting bedclothes, sheets and blankets)? 3   Sitting down on and standing up from a chair with arms (e.g., wheelchair, bedside commode, etc.) 1   Moving from lying on back to sitting on the side of the bed? 1   How much help from another person does the patient currently need...   Moving to and from a bed to a chair (including a wheelchair)? 3   Need to walk in a hospital room? 2   Climbing 3-5 steps with a railing? 1   6 clicks Mobility Score 11   Activity Tolerance   Sitting in Chair 5 min +   Sitting Edge of Bed 10 min   Standing 4 min total   Short Term Goals    Short Term Goal # 1 supine to/from sit EOB w/o use of railings, min assist in 6 visits   Goal Outcome # 1 Progressing as expected   Short Term Goal # 2 sit to stand from EOB with FWW and SBA in  6 visits   Goal Outcome # 2 Progressing as expected   Short Term Goal # 3 ambulation with FWW/LRD and SBA 50 feet in 6 visits   Goal Outcome # 3 Goal not met   Short Term Goal # 4 up/down single step with LRD and SBA in 6 visits   Goal Outcome # 4 Goal not met   Education Group   Transfer Status Patient Response Patient;Acceptance;Explanation;Verbal Demonstration;Action Demonstration   Physical Therapy Treatment Plan   Physical Therapy Treatment Plan Continue Current Treatment Plan   Anticipated Discharge Equipment and Recommendations   DC Equipment Recommendations Front-Wheel Walker   Discharge Recommendations Recommend post-acute placement for additional physical therapy services prior to discharge home   Interdisciplinary Plan of Care Collaboration   IDT Collaboration with  Nursing   Patient Position at End of Therapy Seated;Call Light within Reach;Tray Table within Reach;Phone within Reach;Chair Alarm On   Collaboration Comments RN updated   Session Information   Date / Session Number  5/1 -2 (2/3)  5/3       Nataliya Valle DPT

## 2023-05-02 PROBLEM — E87.6 HYPOKALEMIA: Status: RESOLVED | Noted: 2023-04-27 | Resolved: 2023-05-02

## 2023-05-02 LAB
ANION GAP SERPL CALC-SCNC: 13 MMOL/L (ref 7–16)
BUN SERPL-MCNC: 18 MG/DL (ref 8–22)
CALCIUM SERPL-MCNC: 8.7 MG/DL (ref 8.5–10.5)
CHLORIDE SERPL-SCNC: 98 MMOL/L (ref 96–112)
CO2 SERPL-SCNC: 25 MMOL/L (ref 20–33)
CREAT SERPL-MCNC: 0.41 MG/DL (ref 0.5–1.4)
ERYTHROCYTE [DISTWIDTH] IN BLOOD BY AUTOMATED COUNT: 44.3 FL (ref 35.9–50)
GFR SERPLBLD CREATININE-BSD FMLA CKD-EPI: 96 ML/MIN/1.73 M 2
GLUCOSE SERPL-MCNC: 114 MG/DL (ref 65–99)
HCT VFR BLD AUTO: 32.5 % (ref 37–47)
HGB BLD-MCNC: 11.2 G/DL (ref 12–16)
MCH RBC QN AUTO: 33.5 PG (ref 27–33)
MCHC RBC AUTO-ENTMCNC: 34.5 G/DL (ref 33.6–35)
MCV RBC AUTO: 97.3 FL (ref 81.4–97.8)
PLATELET # BLD AUTO: 168 K/UL (ref 164–446)
PMV BLD AUTO: 9.4 FL (ref 9–12.9)
POTASSIUM SERPL-SCNC: 4 MMOL/L (ref 3.6–5.5)
RBC # BLD AUTO: 3.34 M/UL (ref 4.2–5.4)
SODIUM SERPL-SCNC: 136 MMOL/L (ref 135–145)
WBC # BLD AUTO: 3 K/UL (ref 4.8–10.8)

## 2023-05-02 PROCEDURE — 700102 HCHG RX REV CODE 250 W/ 637 OVERRIDE(OP): Performed by: INTERNAL MEDICINE

## 2023-05-02 PROCEDURE — A9270 NON-COVERED ITEM OR SERVICE: HCPCS | Performed by: INTERNAL MEDICINE

## 2023-05-02 PROCEDURE — 99232 SBSQ HOSP IP/OBS MODERATE 35: CPT | Mod: 25 | Performed by: INTERNAL MEDICINE

## 2023-05-02 PROCEDURE — 97535 SELF CARE MNGMENT TRAINING: CPT | Mod: CO

## 2023-05-02 PROCEDURE — 700102 HCHG RX REV CODE 250 W/ 637 OVERRIDE(OP): Performed by: STUDENT IN AN ORGANIZED HEALTH CARE EDUCATION/TRAINING PROGRAM

## 2023-05-02 PROCEDURE — 80048 BASIC METABOLIC PNL TOTAL CA: CPT

## 2023-05-02 PROCEDURE — 99497 ADVNCD CARE PLAN 30 MIN: CPT | Performed by: INTERNAL MEDICINE

## 2023-05-02 PROCEDURE — A9270 NON-COVERED ITEM OR SERVICE: HCPCS | Performed by: STUDENT IN AN ORGANIZED HEALTH CARE EDUCATION/TRAINING PROGRAM

## 2023-05-02 PROCEDURE — 700101 HCHG RX REV CODE 250: Performed by: PHYSICAL MEDICINE & REHABILITATION

## 2023-05-02 PROCEDURE — 770020 HCHG ROOM/CARE - TELE (206)

## 2023-05-02 PROCEDURE — 36415 COLL VENOUS BLD VENIPUNCTURE: CPT

## 2023-05-02 PROCEDURE — 85027 COMPLETE CBC AUTOMATED: CPT

## 2023-05-02 RX ADMIN — DOCUSATE SODIUM 50 MG AND SENNOSIDES 8.6 MG 2 TABLET: 8.6; 5 TABLET, FILM COATED ORAL at 05:14

## 2023-05-02 RX ADMIN — BRIMONIDINE TARTRATE 1 DROP: 2 SOLUTION OPHTHALMIC at 05:12

## 2023-05-02 RX ADMIN — OXYCODONE 5 MG: 5 TABLET ORAL at 21:08

## 2023-05-02 RX ADMIN — CYCLOBENZAPRINE 5 MG: 10 TABLET, FILM COATED ORAL at 05:12

## 2023-05-02 RX ADMIN — LIDOCAINE 1 PATCH: 50 PATCH TOPICAL at 16:33

## 2023-05-02 RX ADMIN — OXYCODONE 5 MG: 5 TABLET ORAL at 08:40

## 2023-05-02 RX ADMIN — DOCUSATE SODIUM 50 MG AND SENNOSIDES 8.6 MG 2 TABLET: 8.6; 5 TABLET, FILM COATED ORAL at 16:33

## 2023-05-02 RX ADMIN — CYCLOBENZAPRINE 5 MG: 10 TABLET, FILM COATED ORAL at 16:32

## 2023-05-02 ASSESSMENT — PAIN DESCRIPTION - PAIN TYPE
TYPE: ACUTE PAIN

## 2023-05-02 ASSESSMENT — COGNITIVE AND FUNCTIONAL STATUS - GENERAL
TOILETING: A LOT
DRESSING REGULAR LOWER BODY CLOTHING: A LOT
SUGGESTED CMS G CODE MODIFIER DAILY ACTIVITY: CK
DRESSING REGULAR UPPER BODY CLOTHING: A LITTLE
HELP NEEDED FOR BATHING: A LOT
DAILY ACTIVITIY SCORE: 17

## 2023-05-02 ASSESSMENT — ENCOUNTER SYMPTOMS
FALLS: 1
WEAKNESS: 1

## 2023-05-02 NOTE — THERAPY
Occupational Therapy  Daily Treatment     Patient Name: Lizzie Lema  Age:  82 y.o., Sex:  female  Medical Record #: [id number]  Today's Date: 5/2/2023       Precautions: Fall Risk    Assessment    Pt seen for OT tx. Continues to be limited by decreased activity tolerance, balance deficits, inattention and poor safety awareness impacting ability to complete ADLs and ADL transfers independently. SBA supine > sit, continues to c/o pain in R hip during mobility. Completed chair/BSC txf w/ min A. Will continue to follow while in house.     Plan    Treatment Plan Status: Continue Current Treatment Plan    DC Equipment Recommendations: Unable to determine at this time  Discharge Recommendations: Recommend post-acute placement for additional occupational therapy services prior to discharge home       05/02/23 1220   Balance   Sitting Balance (Static) Fair +   Sitting Balance (Dynamic) Fair   Standing Balance (Static) Fair -   Standing Balance (Dynamic) Poor +   Weight Shift Sitting Fair   Weight Shift Standing Poor   Bed Mobility    Supine to Sit Standby Assist   Sit to Supine   (left up in chair)   Activities of Daily Living   Grooming Supervision;Seated   Upper Body Dressing Supervision   Lower Body Dressing Maximal Assist   Toileting Maximal Assist   Functional Mobility   Sit to Stand Minimal Assist   Bed, Chair, Wheelchair Transfer Minimal Assist   Toilet Transfers Minimal Assist   Short Term Goals   Short Term Goal # 1 Pt will complete ADL transfers with supervision   Goal Outcome # 1 Progressing as expected   Short Term Goal # 2 Pt will complete LB dressing with supervision   Goal Outcome # 2 Goal not met   Short Term Goal # 3 Pt will complete toileting with supervision   Goal Outcome # 3 Goal not met   Anticipated Discharge Equipment and Recommendations   DC Equipment Recommendations Unable to determine at this time   Discharge Recommendations Recommend post-acute placement for additional occupational therapy  services prior to discharge home

## 2023-05-02 NOTE — PROGRESS NOTES
Monitor Summary: SR 82-96, KS 0.16, QRS 0.05, QT 0.36, with rare PVCs and couplets, per strip from monitor room.

## 2023-05-02 NOTE — PROGRESS NOTES
Monitor summary: SR 83-98, MN .14, QRS .08, QT .37 with rare PVCs per strip from monitor room.

## 2023-05-02 NOTE — DOCUMENTATION QUERY
"                                                                         [facility]                                                                       Query Response Note      PATIENT:               HUI PENN  ACCT #:                  [id number]  MRN:                     [id number]  :                      [id number]  ADMIT DATE:       2023 5:02 PM  DISCH DATE:          RESPONDING  PROVIDER #:        [id number]           QUERY TEXT:    Based upon your judgment and the above clinical indicators, please select the most appropriate diagnosis for the findings.    Note: If you agree with a diagnosis listed above, please remember to include it in your concurrent daily documentation and onto the Discharge Summary.        The patient's clinical indicators include:  82 year old female admitted with a SDH.      RD Consult Renetta/Ganesh \" Malnutrition Risk: Per ASPEN guidelines, pt with severe malnutrition in the context of chronic illness likely r/t alcohol abuse aeb wt loss of 20.8% x 3-4 months in the past year and severe muscle wasting in clavicle region, mild muscle wasting in temporal  area, moderate fat loss via acromion area and hand region per NFPE.\"  BMI 16.91    Risk factors: medication noncompliance, alcohol abuse  Treatment: labs, RD consult, Boost, encourage meals, monitor weight,     If you have any questions, please contact:  Britney Kirby RN CDI [facility]  [email or web address]  Britney Kirby [address] [employer]  Options provided:   -- Severe protein-calorie malnutrition is a valid diagnosis treated this admission   -- Severe malnutrition, ruled out   -- Moderate malnutrition   -- Mild malnutrition   -- Other explanation, please specify   -- Unable to determine      Query created by: Britney Kirby on 2023 3:05 PM    RESPONSE TEXT:    Severe protein-calorie malnutrition is a valid diagnosis treated this admission          Electronically signed by:  JOANN OSWALD MD 2023 " 4:37 PM

## 2023-05-02 NOTE — DISCHARGE PLANNING
Agency/Facility Name: [facility]  Spoke To: Ender  Outcome: DPA called facility to check on insurance auth. Per Ender, insurance auth has come back. Ender will call DPA back after morning meeting to let DPA know if facility has any female beds available       1010    Agency/Facility Name: [facility]  Spoke To: Ender  Outcome: DPA called facility to check on bed status. Per Ender, facility has no beds today but pt is signed up for a bed tomorrow. Facility will transport pt around 1200  Facility will call back with confirmed transport time

## 2023-05-02 NOTE — CARE PLAN
The patient is Stable - Low risk of patient condition declining or worsening    Shift Goals  Clinical Goals: Stable neuro status, Mobilization  Patient Goals: Rest  Family Goals: JOHNNIE    Progress made toward(s) clinical / shift goals:    Problem: Knowledge Deficit - Standard  Goal: Patient and family/care givers will demonstrate understanding of plan of care, disease process/condition, diagnostic tests and medications  Outcome: Progressing  Note: Pt verbalizes understanding of plan of care and asks appropriate questions, needs reminders at times.     Problem: Pain - Standard  Goal: Alleviation of pain or a reduction in pain to the patient’s comfort goal  Outcome: Progressing  Note: Pt educated on 0-10 pain scale, educated on both pharmacological and non-pharmacological methods of pain control. Pain controlled with repositioning and pharmacological methods.

## 2023-05-02 NOTE — CARE PLAN
The patient is Stable - Low risk of patient condition declining or worsening    Shift Goals  Clinical Goals: Stable neuro status, mobilization  Patient Goals: Sleep  Family Goals: JOHNNIE    Progress made toward(s) clinical / shift goals:    Problem: Knowledge Deficit - Standard  Goal: Patient and family/care givers will demonstrate understanding of plan of care, disease process/condition, diagnostic tests and medications  Outcome: Progressing     Problem: Pain - Standard  Goal: Alleviation of pain or a reduction in pain to the patient’s comfort goal  Outcome: Progressing     Problem: Fall Risk  Goal: Patient will remain free from falls  Outcome: Progressing     Problem: Skin Integrity  Goal: Skin integrity is maintained or improved  Outcome: Progressing       Patient is not progressing towards the following goals:

## 2023-05-02 NOTE — PROGRESS NOTES
Hospital Medicine Daily Progress Note    Date of Service  5/2/2023    Chief Complaint  Lizzie Lema is a 82 y.o. female admitted 4/26/2023 with fall      Hospital Course  82 y.o. 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 She presented at [facility], where she was evaluated with CT head showing right-sided 9 mm subdural hematoma.  Patient transferred here for neurosurgery consult.  Neurosurgery was consulted and rec No Neurosurgical intervention at this time. Follow up as outpatient in 4-6 weeks with repeat head CT no contrast. No anticoagulants, antiplatelet medication unless clear clinical indication exists   Per patient she drinks 2 glasses of wine daily, initially patient treated with CIWA and later was discontinued.      Interval Problem Update  -Evaluated examined the patient at bedside, denied any new symptom patient is alert oriented x4  -Labs reviewed around baseline  -Working for placement    I have discussed this patient's plan of care and discharge plan at IDT rounds today with Case Management, Nursing, Nursing leadership, and other members of the IDT team.    Consultants/Specialty  neurosurgery and trauma surgery    Code Status  Full Code    Disposition  Patient is medically cleared for discharge.   Anticipate discharge to to skilled nursing facility.  I have placed the appropriate orders for post-discharge needs.    Review of Systems  Review of Systems   Constitutional:  Positive for malaise/fatigue.   Musculoskeletal:  Positive for falls and joint pain.   Neurological:  Positive for weakness.   All other systems reviewed and are negative.     Physical Exam  Temp:  [36.3 °C (97.3 °F)-36.8 °C (98.2 °F)] 36.3 °C (97.3 °F)  Pulse:  [] 76  Resp:  [17-18] 18  BP: ()/(56-76) 102/56  SpO2:  [92 %-95 %] 95 %    Physical Exam  Vitals and nursing note  reviewed.   Constitutional:       Appearance: Normal appearance. She is ill-appearing.   HENT:      Head: Normocephalic and atraumatic.      Nose: Nose normal.      Mouth/Throat:      Pharynx: Oropharynx is clear.   Eyes:      Extraocular Movements: Extraocular movements intact.      Conjunctiva/sclera: Conjunctivae normal.      Pupils: Pupils are equal, round, and reactive to light.   Cardiovascular:      Rate and Rhythm: Normal rate and regular rhythm.      Pulses: Normal pulses.      Heart sounds: Normal heart sounds.   Pulmonary:      Effort: Pulmonary effort is normal.      Breath sounds: Normal breath sounds.   Abdominal:      General: Abdomen is flat. Bowel sounds are normal.      Palpations: Abdomen is soft.   Musculoskeletal:         General: Tenderness (R hip) present.      Cervical back: Normal range of motion and neck supple.      Comments: R hip limited ROM due to pain   Skin:     General: Skin is warm and dry.   Neurological:      General: No focal deficit present.      Mental Status: She is alert and oriented to person, place, and time. Mental status is at baseline.   Psychiatric:         Mood and Affect: Mood normal.         Behavior: Behavior normal.       Fluids    Intake/Output Summary (Last 24 hours) at 5/2/2023 1426  Last data filed at 5/1/2023 1800  Gross per 24 hour   Intake 120 ml   Output --   Net 120 ml       Laboratory  Recent Labs     04/30/23 0220 05/02/23  0422   WBC 3.6* 3.0*   RBC 3.45* 3.34*   HEMOGLOBIN 11.5* 11.2*   HEMATOCRIT 34.6* 32.5*   .3* 97.3   MCH 33.3* 33.5*   MCHC 33.2* 34.5   RDW 44.8 44.3   PLATELETCT 145* 168   MPV 9.3 9.4     Recent Labs     04/30/23 0220 05/02/23  0422   SODIUM 133* 136   POTASSIUM 3.5* 4.0   CHLORIDE 97 98   CO2 28 25   GLUCOSE 147* 114*   BUN 16 18   CREATININE 0.43* 0.41*   CALCIUM 8.5 8.7                       Imaging  DX-HIP-COMPLETE - UNILATERAL 2+ RIGHT   Final Result      No acute osseous abnormality.      OUTSIDE IMAGES-CT HEAD    Final Result      CT-FOREIGN FILM CAT SCAN   Final Result      [id number] FILM X-RAY   Final Result             Assessment/Plan  * SDH (subdural hematoma) (HCC)- (present on admission)  Assessment & Plan  Traumatic, secondary to head injury from ground-level fall.    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 No midline shift  Discussed with neurosurgery and trauma surgery  Conservative management recommended. Avoid antiplatelets and anticoagulation.  Discussed with neurosurgery, no indication for Keppra  Maintain normal sodium and blood pressure  PT/OT  Fall precautions  stable, continue working for placement    Pain of right hip  Assessment & Plan  R hip pain with limited ROM, tenderness. Fall  PT/OT  R hip xray ordered, I reviewed the right hip x-ray independently, no sign of acute dislocation or fracture  Multimodal pain managements including po and iv prn narcotics  Ordered Flexeril as needed  Ordered lidocaine patch for pain control with p.o. oxycodone as needed      Pancytopenia  Assessment & Plan  Platelets 108  WBC 4.3  Hemoglobin 10.7.  .3  ?  Secondary to alcohol abuse  No sign of bleeding, cont monitoring cbc    Alcohol abuse with dependence  Assessment & Plan  Monitor for withdrawal  Alcohol cessation counseling, patient voiced understanding  On iv and po ativan prn, monitoring ciwa score and respiratory status  Start B1, folate and multivitamin  4/29: Patient has no sign of alcohol withdrawal.  DC CIWA protocol.  Discussed with pharmacy    Advance care planning  Assessment & Plan  Full Code: The CODE STATUS was discussed with the patient. She has medical decision capacity. She wishes to have all life sustaining efforts if needed. I discussed advance care planning with the patient for 16 minutes, including diagnosis, prognosis, plan of care, risks and benefits of any therapies that could be offered, as well as alternatives  including palliation and hospice, as appropriate.             VTE prophylaxis: SCDs/TEDs    I have performed a physical exam and reviewed and updated ROS and Plan today (5/2/2023). In review of yesterday's note (5/1/2023), there are no changes except as documented above.

## 2023-05-02 NOTE — DISCHARGE PLANNING
Case Management Discharge Planning    Admission Date: 4/26/2023  GMLOS: 3.3  ALOS: 6    6-Clicks ADL Score: 17  6-Clicks Mobility Score: 11  PT and/or OT Eval ordered: Yes  Post-acute Referrals Ordered: Yes  Post-acute Choice Obtained: Yes  Has referral(s) been sent to post-acute provider:  Yes      Anticipated Discharge Dispo: Discharge Disposition: D/T to SNF with Medicare cert in anticipation of skilled care (03)    Discharge Address: [address], [place] [address]    DME Needed: No    Action(s) Taken:  Patient accepted for transfer to [facility] tomorrow (5/3/2023); Facility providing transport and will callback with transport time.    CM met at bedside with patient & Significant Other, Mark, and updated them on DCP; Both agreeable to DC plan.    Escalations Completed: None    Medically Clear: Yes    Next Steps: CM remains available for DCP.    Barriers to Discharge: SNF Bed available on 5/3/2023    Is the patient up for discharge tomorrow: Medically cleared

## 2023-05-03 VITALS
WEIGHT: 95.46 LBS | HEIGHT: 63 IN | HEART RATE: 91 BPM | SYSTOLIC BLOOD PRESSURE: 124 MMHG | OXYGEN SATURATION: 94 % | DIASTOLIC BLOOD PRESSURE: 64 MMHG | RESPIRATION RATE: 18 BRPM | TEMPERATURE: 97.7 F | BODY MASS INDEX: 16.91 KG/M2

## 2023-05-03 PROCEDURE — 700102 HCHG RX REV CODE 250 W/ 637 OVERRIDE(OP): Performed by: INTERNAL MEDICINE

## 2023-05-03 PROCEDURE — A9270 NON-COVERED ITEM OR SERVICE: HCPCS | Performed by: INTERNAL MEDICINE

## 2023-05-03 PROCEDURE — 99239 HOSP IP/OBS DSCHRG MGMT >30: CPT | Performed by: GENERAL PRACTICE

## 2023-05-03 RX ADMIN — BRIMONIDINE TARTRATE 1 DROP: 2 SOLUTION OPHTHALMIC at 05:01

## 2023-05-03 RX ADMIN — DOCUSATE SODIUM 50 MG AND SENNOSIDES 8.6 MG 2 TABLET: 8.6; 5 TABLET, FILM COATED ORAL at 05:00

## 2023-05-03 NOTE — DISCHARGE PLANNING
Agency/Facility Name: [facility]  Outcome: DPA left vmail asking for confirmed transport time with facility       1013    Agency/Facility Name: [facility]  Outcome: DPA received vmail from facility stating facility will pick pt up and transport at 1230 today 5/3/23

## 2023-05-03 NOTE — DISCHARGE SUMMARY
Discharge Summary    CHIEF COMPLAINT ON ADMISSION  Chief Complaint   Patient presents with    Trauma Green     Pt had a GLF last night. Trauma green transfer for SDH, acute vs chronic. Pt arrives [facility] complaining of head pain and right hip pain.        Reason for Admission  Trauma green transfer 82F GLF SDH    Admission Date  4/26/2023     CODE STATUS  Full Code    HPI & HOSPITAL COURSE  This is an 82-year-old female with past medical history of hx CVA, alcohol use disorder and recurrent falls who was transferred from an outside facility on 4/26/2023 after she sustained a GLF, significant for right-sided 9 mm subdural hematoma.    Neurosurgery was consulted, no surgical management.  Patient to follow-up with neurosurgery in about 4 to 6 weeks for repeat head CT imaging.    Given patient's extensive drinking history, CIWA protocol was placed.    Patient was evaluated by PT/OT with recommendations for SNF.    Therefore, she is discharged in good and stable condition to skilled nursing facility.    The patient met 2-midnight criteria for an inpatient stay at the time of discharge.      FOLLOW UP ITEMS POST DISCHARGE  Neurosurgery  PCP    DISCHARGE DIAGNOSES  Principal Problem:    SDH (subdural hematoma) (HCC) POA: Yes  Active Problems:    Subdural hematoma (HCC) POA: Yes    Alcohol abuse with dependence POA: Unknown    Pancytopenia POA: Unknown    Pain of right hip POA: Unknown    Advance care planning POA: Unknown  Resolved Problems:    Hypokalemia POA: Unknown      FOLLOW UP  Bay Maynard M.D.  [address]  [place] [address]  [phone]    Follow up in 4 week(s)        MEDICATIONS ON DISCHARGE     Medication List        CONTINUE taking these medications        Instructions   brimonidine 0.15 % Soln  Commonly known as: ALPHAGAN   Administer 1 Drop into both eyes every day.  Dose: 1 Drop     mirtazapine 15 MG Tabs  Commonly known as: Remeron   Take 15 mg by mouth every evening.  Dose: 15 mg               Allergies  No Known Allergies    DIET  Orders Placed This Encounter   Procedures    Diet Order Diet: Regular     Standing Status:   Standing     Number of Occurrences:   1     Order Specific Question:   Diet:     Answer:   Regular [1]       ACTIVITY  As tolerated and directed by skilled nursing.  Weight bearing as tolerated    LINES, DRAINS, AND WOUNDS  This is an automated list. Peripheral IVs will be removed prior to discharge.  Peripheral IV 04/27/23 22 G Anterior;Distal;Right Forearm (Active)   Site Assessment Clean;Dry;Intact 05/02/23 2000   Dressing Type Transparent 05/02/23 2000   Line Status Scrubbed the hub prior to access;Flushed;Saline locked 05/02/23 2000   Dressing Status Clean;Dry;Intact 05/02/23 2000   Dressing Intervention N/A 05/02/23 2000   Infiltration Grading ([facility], CV) 0 05/02/23 2000   Phlebitis Scale ([facility] Only) 0 05/02/23 2000          Peripheral IV 04/27/23 22 G Anterior;Distal;Right Forearm (Active)   Site Assessment Clean;Dry;Intact 05/02/23 2000   Dressing Type Transparent 05/02/23 2000   Line Status Scrubbed the hub prior to access;Flushed;Saline locked 05/02/23 2000   Dressing Status Clean;Dry;Intact 05/02/23 2000   Dressing Intervention N/A 05/02/23 2000   Infiltration Grading ([facility], JUSTINA) 0 05/02/23 2000   Phlebitis Scale ([facility] Only) 0 05/02/23 2000               MENTAL STATUS ON TRANSFER             CONSULTATIONS  Neurosurgery    PROCEDURES  None    LABORATORY  Lab Results   Component Value Date    SODIUM 136 05/02/2023    POTASSIUM 4.0 05/02/2023    CHLORIDE 98 05/02/2023    CO2 25 05/02/2023    GLUCOSE 114 (H) 05/02/2023    BUN 18 05/02/2023    CREATININE 0.41 (L) 05/02/2023        Lab Results   Component Value Date    WBC 3.0 (L) 05/02/2023    HEMOGLOBIN 11.2 (L) 05/02/2023    HEMATOCRIT 32.5 (L) 05/02/2023    PLATELETCT 168 05/02/2023      DX-HIP-COMPLETE - UNILATERAL 2+ RIGHT   Final Result      No acute osseous abnormality.      OUTSIDE IMAGES-CT HEAD   Final  Result      CT-FOREIGN FILM CAT SCAN   Final Result      [id number] FILM X-RAY   Final Result         Total time of the discharge process exceeds 45 minutes.

## 2023-05-03 NOTE — HOSPITAL COURSE
This is an 82-year-old female with past medical history of hx CVA, alcohol use disorder and recurrent falls who was transferred from an outside facility on 4/26/2023 after she sustained a GLF, significant for right-sided 9 mm subdural hematoma.    Neurosurgery was consulted, no surgical management.  Patient to follow-up with neurosurgery in about 4 to 6 weeks for repeat head CT imaging.    Given patient's extensive drinking history, CIWA protocol was placed.    Patient was evaluated by PT/OT with recommendations for SNF.

## 2023-05-03 NOTE — PROGRESS NOTES
Monitor Summary: SR 77-95, W[place] .0.16, QRS .0.08, QT .0.34 with rare PVCs per strip from monitor room

## 2023-05-03 NOTE — PROGRESS NOTES
Attempted to give report to [facility]. The 1st time RN was left on hold. The 2nd attempt the phone rang and started beeping after.

## 2023-05-03 NOTE — CARE PLAN
The patient is Stable - Low risk of patient condition declining or worsening    Shift Goals  Clinical Goals: Stable neuro status, Pain control, Mobilization  Patient Goals: Pain control  Family Goals: JOHNNIE    Progress made toward(s) clinical / shift goals:    Problem: Knowledge Deficit - Standard  Goal: Patient and family/care givers will demonstrate understanding of plan of care, disease process/condition, diagnostic tests and medications  Outcome: Progressing  Note: Pt demonstrates understanding of plan of care, requires frequent reminders.     Problem: Fall Risk  Goal: Patient will remain free from falls  Outcome: Progressing  Note: Patient educated on fall risk and call light use. Patient verbalizes understanding of calling for assistance before getting out of bed. Bed is locked and in low position, patient has non slip socks, call light and phone are within reach, bed alarm is on.